# Patient Record
Sex: FEMALE | Race: WHITE | NOT HISPANIC OR LATINO | Employment: PART TIME | URBAN - METROPOLITAN AREA
[De-identification: names, ages, dates, MRNs, and addresses within clinical notes are randomized per-mention and may not be internally consistent; named-entity substitution may affect disease eponyms.]

---

## 2017-06-22 ENCOUNTER — ALLSCRIPTS OFFICE VISIT (OUTPATIENT)
Dept: OTHER | Facility: OTHER | Age: 42
End: 2017-06-22

## 2017-10-16 ENCOUNTER — GENERIC CONVERSION - ENCOUNTER (OUTPATIENT)
Dept: OTHER | Facility: OTHER | Age: 42
End: 2017-10-16

## 2018-01-12 VITALS
BODY MASS INDEX: 18.8 KG/M2 | TEMPERATURE: 97.9 F | SYSTOLIC BLOOD PRESSURE: 118 MMHG | RESPIRATION RATE: 14 BRPM | HEART RATE: 80 BPM | DIASTOLIC BLOOD PRESSURE: 70 MMHG | HEIGHT: 66 IN | WEIGHT: 117 LBS

## 2018-01-17 NOTE — RESULT NOTES
Message   I called pt CBC are normal  Pt states she feels a little better after antibiotics cream  Pt asked about CT scan and I told her it is NOT necessary        Verified Results  (1) CBC/PLT/DIFF [id number] 12:29PM Idalmis Backers   REPORT COMMENT:  FASTING:NO     Test Name Result Flag Reference   WHITE BLOOD CELL COUNT 7 5 Thousand/uL  3 8-10 8   RED BLOOD CELL COUNT 4 61 Million/uL  3 80-5 10   HEMOGLOBIN 13 3 g/dL  11 7-15 5   HEMATOCRIT 41 5 %  35 0-45 0   MCV 89 9 fL  80 0-100 0   MCH 28 9 pg  27 0-33 0   MCHC 32 1 g/dL  32 0-36 0   RDW 12 8 %  11 0-15 0   PLATELET COUNT 214 Thousand/uL  140-400   MPV 11 6 fL H 7 5-11 5   ABSOLUTE NEUTROPHILS 5010 cells/uL  0261-4331   ABSOLUTE LYMPHOCYTES 1853 cells/uL  850-3900   ABSOLUTE MONOCYTES 563 cells/uL  200-950   ABSOLUTE EOSINOPHILS 45 cells/uL     ABSOLUTE BASOPHILS 30 cells/uL  0-200   NEUTROPHILS 66 8 %     LYMPHOCYTES 24 7 %     MONOCYTES 7 5 %     EOSINOPHILS 0 6 %     BASOPHILS 0 4 %

## 2018-01-22 VITALS
TEMPERATURE: 98.4 F | HEIGHT: 66 IN | HEART RATE: 76 BPM | SYSTOLIC BLOOD PRESSURE: 130 MMHG | WEIGHT: 124 LBS | DIASTOLIC BLOOD PRESSURE: 86 MMHG | RESPIRATION RATE: 12 BRPM | BODY MASS INDEX: 19.93 KG/M2

## 2019-11-11 ENCOUNTER — OFFICE VISIT (OUTPATIENT)
Dept: FAMILY MEDICINE CLINIC | Facility: CLINIC | Age: 44
End: 2019-11-11
Payer: COMMERCIAL

## 2019-11-11 VITALS
BODY MASS INDEX: 22.33 KG/M2 | SYSTOLIC BLOOD PRESSURE: 116 MMHG | OXYGEN SATURATION: 99 % | DIASTOLIC BLOOD PRESSURE: 78 MMHG | HEART RATE: 72 BPM | TEMPERATURE: 99.1 F | RESPIRATION RATE: 12 BRPM | HEIGHT: 65 IN | WEIGHT: 134 LBS

## 2019-11-11 DIAGNOSIS — L03.211 CELLULITIS OF FACE: ICD-10-CM

## 2019-11-11 DIAGNOSIS — T78.40XA ALLERGIC STATE, INITIAL ENCOUNTER: ICD-10-CM

## 2019-11-11 DIAGNOSIS — L03.211 CELLULITIS OF FACE: Primary | ICD-10-CM

## 2019-11-11 PROBLEM — K64.4 EXTERNAL HEMORRHOIDS: Status: ACTIVE | Noted: 2017-06-22

## 2019-11-11 PROBLEM — K59.00 CONSTIPATION: Status: ACTIVE | Noted: 2017-06-22

## 2019-11-11 PROBLEM — Z01.411 ENCOUNTER FOR GYNECOLOGICAL EXAMINATION WITH ABNORMAL FINDING: Status: ACTIVE | Noted: 2019-01-29

## 2019-11-11 PROBLEM — N76.1 CHRONIC VAGINITIS: Status: ACTIVE | Noted: 2017-01-11

## 2019-11-11 PROCEDURE — 3725F SCREEN DEPRESSION PERFORMED: CPT | Performed by: FAMILY MEDICINE

## 2019-11-11 PROCEDURE — 99213 OFFICE O/P EST LOW 20 MIN: CPT | Performed by: FAMILY MEDICINE

## 2019-11-11 PROCEDURE — 1036F TOBACCO NON-USER: CPT | Performed by: FAMILY MEDICINE

## 2019-11-11 RX ORDER — CEFUROXIME AXETIL 500 MG/1
500 TABLET ORAL EVERY 12 HOURS SCHEDULED
Qty: 20 TABLET | Refills: 0 | Status: SHIPPED | OUTPATIENT
Start: 2019-11-11 | End: 2019-11-12 | Stop reason: SDUPTHER

## 2019-11-11 RX ORDER — FLUTICASONE PROPIONATE 50 MCG
2 SPRAY, SUSPENSION (ML) NASAL DAILY
COMMUNITY
Start: 2013-09-17

## 2019-11-11 RX ORDER — TRAMADOL HYDROCHLORIDE 50 MG/1
50 TABLET ORAL EVERY 12 HOURS PRN
Qty: 20 TABLET | Refills: 0 | Status: SHIPPED | OUTPATIENT
Start: 2019-11-11 | End: 2019-11-12 | Stop reason: SDUPTHER

## 2019-11-11 RX ORDER — DROSPIRENONE AND ETHINYL ESTRADIOL 0.03MG-3MG
1 KIT ORAL DAILY
COMMUNITY
Start: 2019-01-29

## 2019-11-11 RX ORDER — OMEGA-3 FATTY ACIDS/FISH OIL 300-1000MG
CAPSULE ORAL
COMMUNITY
Start: 2015-11-23 | End: 2020-09-09 | Stop reason: ALTCHOICE

## 2019-11-11 RX ORDER — DIPHENHYDRAMINE HCL 25 MG
25 CAPSULE ORAL EVERY 6 HOURS PRN
COMMUNITY

## 2019-11-11 RX ORDER — MONTELUKAST SODIUM 10 MG/1
10 TABLET ORAL
Qty: 30 TABLET | Refills: 1 | Status: SHIPPED | OUTPATIENT
Start: 2019-11-11 | End: 2019-11-12 | Stop reason: SDUPTHER

## 2019-11-11 NOTE — PROGRESS NOTES
Chief Complaint   Patient presents with    Sinus Problem     Symptoms sinus swelling and pain started Friday night  Assessment/Plan:    Advised pt to come here annually for physical exam  Pt states she will call when she gets better  Diagnoses and all orders for this visit:    Cellulitis of face  -     cefuroxime (CEFTIN) 500 mg tablet; Take 1 tablet (500 mg total) by mouth every 12 (twelve) hours for 10 days  -     traMADol (ULTRAM) 50 mg tablet; Take 1 tablet (50 mg total) by mouth every 12 (twelve) hours as needed for moderate pain    Allergic state, initial encounter  -     montelukast (SINGULAIR) 10 mg tablet; Take 1 tablet (10 mg total) by mouth daily at bedtime    Other orders  -     Ibuprofen (ADVIL) 200 MG CAPS; Take by mouth  -     diphenhydrAMINE (BENADRYL) 25 mg capsule; Take 25 mg by mouth every 6 (six) hours as needed  -     Multiple Vitamins-Minerals (MULTIVITAMIN ADULTS PO); Take 1 capsule by mouth daily  -     drospirenone-ethinyl estradiol (EMERY) 3-0 03 MG per tablet; Take 1 tablet by mouth daily  -     fluticasone (FLONASE) 50 mcg/act nasal spray; 2 sprays into each nostril daily          Subjective:      Patient ID: Maykel Mccrary is a 45 y o  female  HPI    Pt is here with her son and daughter  C/o nose pain and face swollen for 3 days  Pt states her nose pain started Friday night  Then she noticed face swollen and facial pain during weekends  She has severe nose/face pain, 8/10  She tried OTC ibuprofen 200mg 4 tabs but no help and it made her stomachache  She states she had similar episode 2 years ago and antibiotics helped  Pt states she picks her nose always  Denies fever, Sob, cP, n/v/abd pain  Pt also states she has seasonal allergies, had watery eyes, running nose, tried OTC claritin, zyrtec, xyzal, but nothing helped               The following portions of the patient's history were reviewed and updated as appropriate: allergies, current medications, past family history, past medical history, past social history, past surgical history and problem list     Review of Systems   Constitutional: Negative for appetite change, chills and fever  HENT: Positive for facial swelling and sinus pain  Negative for congestion, ear pain and sore throat  Eyes: Negative for discharge and itching  Respiratory: Negative for apnea, cough, chest tightness, shortness of breath and wheezing  Cardiovascular: Negative for chest pain, palpitations and leg swelling  Gastrointestinal: Negative for abdominal pain, anal bleeding, constipation, diarrhea, nausea and vomiting  Endocrine: Negative for cold intolerance, heat intolerance and polyuria  Genitourinary: Negative for difficulty urinating and dysuria  Musculoskeletal: Negative for arthralgias, back pain and myalgias  Skin: Negative for rash  Neurological: Negative for dizziness and headaches  Psychiatric/Behavioral: Negative for agitation  Objective:      /78 (BP Location: Left arm, Patient Position: Sitting, Cuff Size: Adult)   Pulse 72   Temp 99 1 °F (37 3 °C) (Tympanic)   Resp 12   Ht 5' 5" (1 651 m)   Wt 60 8 kg (134 lb)   SpO2 99%   BMI 22 30 kg/m²          Physical Exam   Constitutional: She appears well-developed  No distress  HENT:   Head: Normocephalic  Right Ear: External ear normal    Left Ear: External ear normal    Mouth/Throat: Oropharynx is clear and moist    B/l nasal swollen  Face mild swollen, tenderness, no erythema   Eyes: Pupils are equal, round, and reactive to light  Conjunctivae are normal  Right eye exhibits no discharge  Left eye exhibits no discharge  Neck: Normal range of motion  No thyromegaly present  Cardiovascular: Normal rate, regular rhythm and normal heart sounds  Exam reveals no gallop and no friction rub  No murmur heard  Pulmonary/Chest: Effort normal and breath sounds normal  No respiratory distress  She has no wheezes  She has no rales   She exhibits no tenderness  Abdominal: Soft  Bowel sounds are normal    Lymphadenopathy:     She has no cervical adenopathy  Neurological: She is alert  Psychiatric: She has a normal mood and affect

## 2019-11-11 NOTE — TELEPHONE ENCOUNTER
Patients prescriptions for cefuroxime, montelukast and traMADol need to be sent to the [facility] in [place] (# [phone] as the original pharmacy does not take her insurance

## 2019-11-13 RX ORDER — TRAMADOL HYDROCHLORIDE 50 MG/1
50 TABLET ORAL EVERY 12 HOURS PRN
Qty: 20 TABLET | Refills: 0 | Status: SHIPPED | OUTPATIENT
Start: 2019-11-13 | End: 2020-09-09 | Stop reason: ALTCHOICE

## 2019-11-13 RX ORDER — MONTELUKAST SODIUM 10 MG/1
10 TABLET ORAL
Qty: 30 TABLET | Refills: 1 | Status: SHIPPED | OUTPATIENT
Start: 2019-11-13 | End: 2020-09-09 | Stop reason: ALTCHOICE

## 2019-11-13 RX ORDER — CEFUROXIME AXETIL 500 MG/1
500 TABLET ORAL EVERY 12 HOURS SCHEDULED
Qty: 20 TABLET | Refills: 0 | Status: SHIPPED | OUTPATIENT
Start: 2019-11-13 | End: 2019-11-23

## 2020-09-09 ENCOUNTER — OFFICE VISIT (OUTPATIENT)
Dept: FAMILY MEDICINE CLINIC | Facility: CLINIC | Age: 45
End: 2020-09-09
Payer: COMMERCIAL

## 2020-09-09 VITALS
DIASTOLIC BLOOD PRESSURE: 78 MMHG | HEIGHT: 65 IN | HEART RATE: 96 BPM | BODY MASS INDEX: 21.02 KG/M2 | RESPIRATION RATE: 14 BRPM | SYSTOLIC BLOOD PRESSURE: 120 MMHG | TEMPERATURE: 98 F | OXYGEN SATURATION: 99 % | WEIGHT: 126.2 LBS

## 2020-09-09 DIAGNOSIS — M62.838 MUSCLE SPASM: Primary | ICD-10-CM

## 2020-09-09 DIAGNOSIS — M54.2 NECK PAIN: ICD-10-CM

## 2020-09-09 DIAGNOSIS — Z00.00 ENCOUNTER FOR PREVENTIVE CARE: ICD-10-CM

## 2020-09-09 PROCEDURE — 3725F SCREEN DEPRESSION PERFORMED: CPT | Performed by: FAMILY MEDICINE

## 2020-09-09 PROCEDURE — 99213 OFFICE O/P EST LOW 20 MIN: CPT | Performed by: FAMILY MEDICINE

## 2020-09-09 PROCEDURE — 1036F TOBACCO NON-USER: CPT | Performed by: FAMILY MEDICINE

## 2020-09-09 RX ORDER — CYCLOBENZAPRINE HCL 10 MG
10 TABLET ORAL 3 TIMES DAILY PRN
Qty: 30 TABLET | Refills: 0 | Status: SHIPPED | OUTPATIENT
Start: 2020-09-09

## 2020-09-09 RX ORDER — NAPROXEN 500 MG/1
500 TABLET ORAL 2 TIMES DAILY WITH MEALS
Qty: 60 TABLET | Refills: 0 | Status: SHIPPED | OUTPATIENT
Start: 2020-09-09 | End: 2020-10-05

## 2020-10-03 DIAGNOSIS — M62.838 MUSCLE SPASM: ICD-10-CM

## 2020-10-05 RX ORDER — NAPROXEN 500 MG/1
TABLET ORAL
Qty: 60 TABLET | Refills: 0 | Status: SHIPPED | OUTPATIENT
Start: 2020-10-05

## 2022-01-05 ENCOUNTER — TELEMEDICINE (OUTPATIENT)
Dept: FAMILY MEDICINE CLINIC | Facility: CLINIC | Age: 47
End: 2022-01-05
Payer: COMMERCIAL

## 2022-01-05 DIAGNOSIS — J02.9 PHARYNGITIS, UNSPECIFIED ETIOLOGY: Primary | ICD-10-CM

## 2022-01-05 DIAGNOSIS — Z20.822 EXPOSURE TO COVID-19 VIRUS: ICD-10-CM

## 2022-01-05 PROCEDURE — 99213 OFFICE O/P EST LOW 20 MIN: CPT | Performed by: FAMILY MEDICINE

## 2022-01-05 RX ORDER — CEFUROXIME AXETIL 500 MG/1
500 TABLET ORAL EVERY 12 HOURS SCHEDULED
Qty: 20 TABLET | Refills: 0 | Status: SHIPPED | OUTPATIENT
Start: 2022-01-05 | End: 2022-01-15

## 2022-01-05 RX ORDER — LIDOCAINE HYDROCHLORIDE 20 MG/ML
15 SOLUTION OROPHARYNGEAL 4 TIMES DAILY PRN
Qty: 100 ML | Refills: 0 | Status: SHIPPED | OUTPATIENT
Start: 2022-01-05

## 2022-01-05 NOTE — PROGRESS NOTES
Virtual Regular Visit    Verification of patient location:    Patient is located in the following state in which I hold an active license [place]      Assessment/Plan:    Problem List Items Addressed This Visit     None      Visit Diagnoses     Pharyngitis, unspecified etiology    -  Primary    Relevant Medications    cefuroxime (CEFTIN) 500 mg tablet    Lidocaine Viscous HCl (XYLOCAINE) 2 % mucosal solution    Exposure to COVID-19 virus        Relevant Orders    COVID Only- Collected at [facility] or [facility]               Reason for visit is   Chief Complaint   Patient presents with    Sore Throat     Throat swollen and unable to eat or drink, white spots on tounge, body aches, ears ache with pressure and burning,  and slight cough   Virtual Regular Visit      Health Maintenance   Topic Date Due    Hepatitis C Screening  Never done    COVID-19 Vaccine (1) Never done    HIV Screening  Never done    BMI: Adult  Never done    Annual Physical  Never done    Cervical Cancer Screening  Never done    Breast Cancer Screening: Mammogram  Never done    Influenza Vaccine (1) 09/01/2021    DTaP,Tdap,and Td Vaccines (3 - Td or Tdap) 02/13/2025    Pneumococcal Vaccine: Pediatrics (0 to 5 Years) and At-Risk Patients (6 to 59 Years)  Aged Out    HIB Vaccine  Aged Out    Hepatitis B Vaccine  Aged Out    IPV Vaccine  Aged Out    Hepatitis A Vaccine  Aged Out    Meningococcal ACWY Vaccine  Aged Out    HPV Vaccine  Aged Out     Encounter provider Cynthia Jones MD    Provider located at [address] [place] [address]  [address] [id number]      Recent Visits  No visits were found meeting these conditions    Showing recent visits within past 7 days and meeting all other requirements  Today's Visits  Date Type Provider Dept   01/05/22 240 [facility] [address], [address] today's visits and meeting all other requirements  Future Appointments  No visits were found meeting these conditions  Showing future appointments within next 150 days and meeting all other requirements       The patient was identified by name and date of birth  Bianca Stover was informed that this is a telemedicine visit and that the visit is being conducted through [address] [place] [address] [facility] and patient was informed that this is a secure, HIPAA-compliant platform  She agrees to proceed     My office door was closed  No one else was in the room  She acknowledged consent and understanding of privacy and security of the video platform  The patient has agreed to participate and understands they can discontinue the visit at any time  Patient is aware this is a billable service  Subjective  Bianca Stover is a 42 y o  female for sore throat   HPI     C/o sore throat for 3 days  Pt states she saw white spot/ulcer on her tonsils  Hard to eat or drink  Felt pain radiating to her ears  No fever but body ache  Worse in right lower back  Denies cough, SOB, CP, n/v/abd pain   Daughter had covid19 during Christmas  Pt is unvaccinated  History reviewed  No pertinent past medical history      Past Surgical History:   Procedure Laterality Date    CERVICAL BIOPSY  W/ LOOP ELECTRODE EXCISION         Current Outpatient Medications   Medication Sig Dispense Refill    cefuroxime (CEFTIN) 500 mg tablet Take 1 tablet (500 mg total) by mouth every 12 (twelve) hours for 10 days 20 tablet 0    cyclobenzaprine (FLEXERIL) 10 mg tablet Take 1 tablet (10 mg total) by mouth 3 (three) times a day as needed for muscle spasms 30 tablet 0    diphenhydrAMINE (BENADRYL) 25 mg capsule Take 25 mg by mouth every 6 (six) hours as needed      drospirenone-ethinyl estradiol (EMERY) 3-0 03 MG per tablet Take 1 tablet by mouth daily      fluticasone (FLONASE) 50 mcg/act nasal spray 2 sprays into each nostril daily      Lidocaine Viscous HCl (XYLOCAINE) 2 % mucosal solution Swish and spit 15 mL 4 (four) times a day as needed for mouth pain or discomfort 100 mL 0    Multiple Vitamins-Minerals (MULTIVITAMIN ADULTS PO) Take 1 capsule by mouth daily      naproxen (NAPROSYN) 500 mg tablet TAKE 1 TABLET BY MOUTH TWICE A DAY WITH MEALS 60 tablet 0     No current facility-administered medications for this visit  Allergies   Allergen Reactions    [place] (Diagnostic) - Food Allergy Other (See Comments)     almonds :  throat closes    Pollen Extract Other (See Comments)     eyes itchy,sneezing,    Tree Extract     Penicillins Rash       Review of Systems   Constitutional: Negative for appetite change, chills and fever  HENT: Positive for ear pain and sore throat  Negative for congestion and sinus pain  Eyes: Negative for discharge and itching  Respiratory: Negative for apnea, cough, chest tightness, shortness of breath and wheezing  Cardiovascular: Negative for chest pain, palpitations and leg swelling  Gastrointestinal: Negative for abdominal pain, anal bleeding, constipation, diarrhea, nausea and vomiting  Endocrine: Negative for cold intolerance, heat intolerance and polyuria  Genitourinary: Negative for difficulty urinating and dysuria  Musculoskeletal: Positive for back pain and myalgias  Negative for arthralgias  Skin: Negative for rash  Neurological: Positive for weakness  Negative for dizziness and headaches  Psychiatric/Behavioral: Negative for agitation  Video Exam    There were no vitals filed for this visit  Physical Exam  Constitutional:       General: She is not in acute distress  Pulmonary:      Effort: Pulmonary effort is normal    Musculoskeletal:      Cervical back: Normal range of motion  Neurological:      Mental Status: She is alert  I spent 10 minutes directly with the patient during this visit    VIRTUAL VISIT [address] verbally agrees to participate in [employer]   Pt is aware that [facility] could be limited without vital signs or the ability to perform a full hands-on physical exam  Melissa Mari understands she or the provider may request at any time to terminate the video visit and request the patient to seek care or treatment in person

## 2022-01-10 PROCEDURE — U0003 INFECTIOUS AGENT DETECTION BY NUCLEIC ACID (DNA OR RNA); SEVERE ACUTE RESPIRATORY SYNDROME CORONAVIRUS 2 (SARS-COV-2) (CORONAVIRUS DISEASE [COVID-19]), AMPLIFIED PROBE TECHNIQUE, MAKING USE OF HIGH THROUGHPUT TECHNOLOGIES AS DESCRIBED BY CMS-2020-01-R: HCPCS | Performed by: FAMILY MEDICINE

## 2022-01-10 PROCEDURE — U0005 INFEC AGEN DETEC AMPLI PROBE: HCPCS | Performed by: FAMILY MEDICINE

## 2022-01-12 ENCOUNTER — TELEMEDICINE (OUTPATIENT)
Dept: FAMILY MEDICINE CLINIC | Facility: CLINIC | Age: 47
End: 2022-01-12
Payer: COMMERCIAL

## 2022-01-12 DIAGNOSIS — U07.1 COVID-19: Primary | ICD-10-CM

## 2022-01-12 PROCEDURE — 99213 OFFICE O/P EST LOW 20 MIN: CPT | Performed by: FAMILY MEDICINE

## 2022-01-12 NOTE — PROGRESS NOTES
COVID-19 Outpatient Progress Note    Chief Complaint   Patient presents with    COVID-19     Positive 01/10/22  Health Maintenance   Topic Date Due    Hepatitis C Screening  Never done    COVID-19 Vaccine (1) Never done    HIV Screening  Never done    BMI: Adult  Never done    Annual Physical  Never done    Cervical Cancer Screening  Never done    Breast Cancer Screening: Mammogram  Never done    Influenza Vaccine (1) 09/01/2021    DTaP,Tdap,and Td Vaccines (3 - Td or Tdap) 02/13/2025    Pneumococcal Vaccine: Pediatrics (0 to 5 Years) and At-Risk Patients (6 to 59 Years)  Aged Out    HIB Vaccine  Aged Out    Hepatitis B Vaccine  Aged Out    IPV Vaccine  Aged Out    Hepatitis A Vaccine  Aged Out    Meningococcal ACWY Vaccine  Aged Out    HPV Vaccine  Aged Out          Assessment/Plan:    Problem List Items Addressed This Visit        Other    COVID-19 - Primary     Day 10 today  75% improved  Still has lower back pain  May use tylenol for pain  Disposition:     Patient has COVID-19 infection  Based off CDC guidelines, they were recommended to isolate for 5 days from the date of the positive test  If they remain asymptomatic, isolation may be ended followed by 5 days of wearing a mask when around othes to minimize risk of infecting others  If they have a fever, continue to stay home until fever resolves for at least 24 hours  I have spent 15 minutes directly with the patient  Greater than 50% of this time was spent in counseling/coordination of care regarding: diagnostic results, prognosis, risks and benefits of treatment options, instructions for management, patient and family education, risk factor reductions and impressions        Encounter provider Yamil Drake MD    Provider located at [address] [place] [phone]    Recent Visits  Date Type Provider Dept   01/05/22 240 [facility] [address], [facility]  [facility]   Showing recent visits within past 7 days and meeting all other requirements  Today's Visits  Date Type Provider Dept   01/12/22 240 [facility] [address], [place] [address] today's visits and meeting all other requirements  Future Appointments  No visits were found meeting these conditions  Showing future appointments within next 150 days and meeting all other requirements     This virtual check-in was done via [address] and patient was informed that this is a secure, HIPAA-compliant platform  She agrees to proceed  Patient agrees to participate in a virtual check in via telephone or video visit instead of presenting to the office to address urgent/immediate medical needs  Patient is aware this is a billable service  After connecting through [facility], the patient was identified by name and date of birth  Regina Gunn was informed that this was a telemedicine visit and that the exam was being conducted confidentially over secure lines  Regina Gunn acknowledged consent and understanding of privacy and security of the telemedicine visit  I informed the patient that I have reviewed her record in Epic and presented the opportunity for her to ask any questions regarding the visit today  The patient agreed to participate  Verification of patient location:  Patient is located in the following state in which I hold an active license: [place]    Subjective:   Regina Gunn is a 42 y o  female who has been screened for COVID-19  Symptom change since last report: improving  Patient's symptoms include fatigue, sore throat, anosmia, loss of taste, nausea, vomiting, myalgias and headache  Patient denies fever, chills, congestion, rhinorrhea, cough, shortness of breath, chest tightness, abdominal pain and diarrhea  - Date of symptom onset: 1/3/2022  - Date of positive COVID-19 PCR: 1/10/2022   Type of test: PCR    COVID-19 vaccination status: Not vaccinated    Nate has been staying home and has isolated themselves in her home  She is taking care to not share personal items and is cleaning all surfaces that are touched often, like counters, tabletops, and doorknobs using household cleaning sprays or wipes  She is wearing a mask when she leaves her room  70% better  Still has lower back pain  Denies urinary symptoms now  Lab Results   Component Value Date    SARSCOV2 Positive (A) 01/10/2022     History reviewed  No pertinent past medical history  Past Surgical History:   Procedure Laterality Date    CERVICAL BIOPSY  W/ LOOP ELECTRODE EXCISION       Current Outpatient Medications   Medication Sig Dispense Refill    cefuroxime (CEFTIN) 500 mg tablet Take 1 tablet (500 mg total) by mouth every 12 (twelve) hours for 10 days 20 tablet 0    cyclobenzaprine (FLEXERIL) 10 mg tablet Take 1 tablet (10 mg total) by mouth 3 (three) times a day as needed for muscle spasms 30 tablet 0    diphenhydrAMINE (BENADRYL) 25 mg capsule Take 25 mg by mouth every 6 (six) hours as needed      drospirenone-ethinyl estradiol (EMERY) 3-0 03 MG per tablet Take 1 tablet by mouth daily      fluticasone (FLONASE) 50 mcg/act nasal spray 2 sprays into each nostril daily      Lidocaine Viscous HCl (XYLOCAINE) 2 % mucosal solution Swish and spit 15 mL 4 (four) times a day as needed for mouth pain or discomfort 100 mL 0    Multiple Vitamins-Minerals (MULTIVITAMIN ADULTS PO) Take 1 capsule by mouth daily      naproxen (NAPROSYN) 500 mg tablet TAKE 1 TABLET BY MOUTH TWICE A DAY WITH MEALS 60 tablet 0     No current facility-administered medications for this visit  Allergies   Allergen Reactions    [place] (Diagnostic) - Food Allergy Other (See Comments)     almonds :  throat closes    Pollen Extract Other (See Comments)     eyes itchy,sneezing,    Tree Extract     Penicillins Rash       Review of Systems   Constitutional: Positive for fatigue  Negative for appetite change, chills and fever     HENT: Positive for sore throat  Negative for congestion, ear pain, rhinorrhea and sinus pain  Eyes: Negative for discharge and itching  Respiratory: Negative for apnea, cough, chest tightness, shortness of breath and wheezing  Cardiovascular: Negative for chest pain, palpitations and leg swelling  Gastrointestinal: Positive for nausea and vomiting  Negative for abdominal pain, anal bleeding, constipation and diarrhea  Endocrine: Negative for cold intolerance, heat intolerance and polyuria  Genitourinary: Negative for difficulty urinating and dysuria  Musculoskeletal: Positive for back pain and myalgias  Negative for arthralgias  Skin: Negative for rash  Neurological: Positive for headaches  Negative for dizziness  Psychiatric/Behavioral: Negative for agitation  Objective: There were no vitals filed for this visit  Physical Exam  Constitutional:       Appearance: Normal appearance  Eyes:      General:         Right eye: No discharge  Left eye: No discharge  Conjunctiva/sclera: Conjunctivae normal    Pulmonary:      Effort: Pulmonary effort is normal    Musculoskeletal:         General: Normal range of motion  Cervical back: Normal range of motion  Neurological:      Mental Status: She is alert  VIRTUAL VISIT DISCLAIMER    Regina Gunn verbally agrees to participate in [employer]  Pt is aware that [employer] could be limited without vital signs or the ability to perform a full hands-on physical exam  Jennifer Lynder Primrose understands she or the provider may request at any time to terminate the video visit and request the patient to seek care or treatment in person

## 2022-11-10 ENCOUNTER — OFFICE VISIT (OUTPATIENT)
Dept: URGENT CARE | Age: 47
End: 2022-11-10

## 2022-11-10 ENCOUNTER — APPOINTMENT (OUTPATIENT)
Dept: LAB | Age: 47
End: 2022-11-10

## 2022-11-10 ENCOUNTER — APPOINTMENT (OUTPATIENT)
Dept: RADIOLOGY | Age: 47
End: 2022-11-10

## 2022-11-10 VITALS
HEART RATE: 84 BPM | DIASTOLIC BLOOD PRESSURE: 86 MMHG | OXYGEN SATURATION: 99 % | TEMPERATURE: 98 F | RESPIRATION RATE: 18 BRPM | SYSTOLIC BLOOD PRESSURE: 122 MMHG

## 2022-11-10 DIAGNOSIS — M79.671 RIGHT FOOT PAIN: Primary | ICD-10-CM

## 2022-11-10 DIAGNOSIS — M79.671 RIGHT FOOT PAIN: ICD-10-CM

## 2022-11-10 LAB — URATE SERPL-MCNC: 3.4 MG/DL (ref 2–7.5)

## 2022-11-10 RX ORDER — INDOMETHACIN 50 MG/1
50 CAPSULE ORAL
Qty: 15 CAPSULE | Refills: 0 | Status: SHIPPED | OUTPATIENT
Start: 2022-11-10 | End: 2022-11-15

## 2022-11-10 NOTE — PROGRESS NOTES
[address] [facility]        NAME: Donna Jo is a 41 y o  female  : 1975    MRN: [id number]  DATE: November 10, 2022  TIME: 11:27 AM    Assessment and Plan   Right foot pain [M79 671]  1  Right foot pain  XR foot 3+ vw right    indomethacin (INDOCIN) 50 mg capsule    Uric acid         Patient Instructions     XR prelim reading: no acute fractures or osseous abnormalities  No soft tissue swelling  Presentation consistent with gout  Start Indomethacin  F/u on uric acid level  If negative, follow up with PCP or Ortho for further workup  Follow up with PCP in 2-3 days  Proceed to ER if symptoms worsen  Chief Complaint     Chief Complaint   Patient presents with   • Foot Pain     Right foot pain starting yesterday evening  On ball of foot  OTC pain medication not working  Denies trauma         History of Present Illness     41 y o  F presents with complaint of R foot pain starting last night  Denies trauma or injury  Denies strenuous activity  States she walks a lot to get to school  Took max dose of Ibuprofen with no relief in pain  No ice or heat  Denies prior trauma or surgery  Able to bear weight  Denies numbness or tingling  Denies hx of arthritis or gout  Recent dx of nephrolithiasis 2022  Review of Systems   Review of Systems   Constitutional: Negative for chills, fatigue and fever  HENT: Negative for congestion, ear pain, facial swelling, hearing loss, rhinorrhea, sinus pressure, sneezing, sore throat and trouble swallowing  Eyes: Negative for pain, redness and visual disturbance  Respiratory: Negative for cough, chest tightness, shortness of breath and wheezing  Cardiovascular: Negative for chest pain and palpitations  Gastrointestinal: Negative for abdominal pain, diarrhea, nausea and vomiting  Genitourinary: Negative for dysuria, flank pain, hematuria and pelvic pain  Musculoskeletal: Positive for arthralgias and joint swelling  Negative for back pain and myalgias  Skin: Negative for color change and rash  Neurological: Negative for dizziness, seizures, syncope, weakness, light-headedness, numbness and headaches  Psychiatric/Behavioral: Negative for confusion, hallucinations and sleep disturbance  The patient is not nervous/anxious  All other systems reviewed and are negative          Current Medications       Current Outpatient Medications:   •  indomethacin (INDOCIN) 50 mg capsule, Take 1 capsule (50 mg total) by mouth 3 (three) times a day with meals for 5 days, Disp: 15 capsule, Rfl: 0  •  Multiple Vitamins-Minerals (MULTIVITAMIN ADULTS PO), Take 1 capsule by mouth daily, Disp: , Rfl:   •  cyclobenzaprine (FLEXERIL) 10 mg tablet, Take 1 tablet (10 mg total) by mouth 3 (three) times a day as needed for muscle spasms (Patient not taking: Reported on 11/10/2022), Disp: 30 tablet, Rfl: 0  •  diphenhydrAMINE (BENADRYL) 25 mg capsule, Take 25 mg by mouth every 6 (six) hours as needed (Patient not taking: Reported on 11/10/2022), Disp: , Rfl:   •  drospirenone-ethinyl estradiol (EMERY) 3-0 03 MG per tablet, Take 1 tablet by mouth daily (Patient not taking: Reported on 11/10/2022), Disp: , Rfl:   •  fluticasone (FLONASE) 50 mcg/act nasal spray, 2 sprays into each nostril daily (Patient not taking: Reported on 11/10/2022), Disp: , Rfl:   •  Lidocaine Viscous HCl (XYLOCAINE) 2 % mucosal solution, Swish and spit 15 mL 4 (four) times a day as needed for mouth pain or discomfort (Patient not taking: Reported on 11/10/2022), Disp: 100 mL, Rfl: 0    Current Allergies     Allergies as of 11/10/2022 - Reviewed 11/10/2022   Allergen Reaction Noted   • [place] (diagnostic) - food allergy Other (See Comments) 07/02/2015   • Pollen extract Other (See Comments) 07/02/2015   • Tree extract  11/11/2019   • Penicillins Rash 06/06/2012            The following portions of the patient's history were reviewed and updated as appropriate: allergies, current medications, past family history, past medical history, past social history, past surgical history and problem list      History reviewed  No pertinent past medical history  Past Surgical History:   Procedure Laterality Date   • CERVICAL BIOPSY  W/ LOOP ELECTRODE EXCISION         Family History   Problem Relation Age of Onset   • Leukemia Brother    • Diabetes Paternal Grandmother    • Dementia Paternal Grandmother          Medications have been verified  Objective   /86   Pulse 84   Temp 98 °F (36 7 °C)   Resp 18   SpO2 99%   No LMP recorded  Physical Exam     Physical Exam  Vitals reviewed  Constitutional:       General: She is not in acute distress  Appearance: Normal appearance  She is not toxic-appearing  HENT:      Head: Normocephalic  Right Ear: Tympanic membrane normal  Tympanic membrane is not erythematous or bulging  Left Ear: Tympanic membrane normal  Tympanic membrane is not erythematous or bulging  Nose: No congestion or rhinorrhea  Right Sinus: No maxillary sinus tenderness or frontal sinus tenderness  Left Sinus: No maxillary sinus tenderness or frontal sinus tenderness  Mouth/Throat:      Pharynx: Uvula midline  No oropharyngeal exudate, posterior oropharyngeal erythema or uvula swelling  Tonsils: No tonsillar exudate or tonsillar abscesses  Eyes:      Extraocular Movements: Extraocular movements intact  Conjunctiva/sclera: Conjunctivae normal       Pupils: Pupils are equal, round, and reactive to light  Cardiovascular:      Rate and Rhythm: Normal rate and regular rhythm  Pulses: Normal pulses  Heart sounds: Normal heart sounds  Pulmonary:      Effort: Pulmonary effort is normal  No tachypnea or respiratory distress  Breath sounds: Normal breath sounds and air entry  No decreased breath sounds, wheezing, rhonchi or rales  Abdominal:      General: Bowel sounds are normal       Palpations: Abdomen is soft  Tenderness:  There is no abdominal tenderness  There is no right CVA tenderness or guarding  Musculoskeletal:         General: Normal range of motion  Cervical back: Normal range of motion  Right foot: Swelling and bony tenderness present  Comments:   TTP 1st MTP extending into MT  +swelling  Mild erythema, no warmth   Lymphadenopathy:      Cervical: No cervical adenopathy  Skin:     General: Skin is warm and dry  Neurological:      General: No focal deficit present  Mental Status: She is alert  Cranial Nerves: Cranial nerves are intact  Sensory: Sensation is intact  Motor: Motor function is intact  Coordination: Coordination is intact  Gait: Gait is intact  Deep Tendon Reflexes: Reflexes are normal and symmetric

## 2022-11-10 NOTE — PATIENT INSTRUCTIONS
XR was negative for any fractures or osseous abnormalities  Presentation consistent with gout  Will order uric acid level  Start Indomethacin as directed  Follow up with PCP in 3-5 days  Proceed to ER if symptoms worsen

## 2022-11-29 ENCOUNTER — OFFICE VISIT (OUTPATIENT)
Dept: FAMILY MEDICINE CLINIC | Facility: CLINIC | Age: 47
End: 2022-11-29

## 2022-11-29 VITALS
WEIGHT: 122 LBS | TEMPERATURE: 98.7 F | DIASTOLIC BLOOD PRESSURE: 68 MMHG | BODY MASS INDEX: 19.15 KG/M2 | SYSTOLIC BLOOD PRESSURE: 100 MMHG | RESPIRATION RATE: 16 BRPM | HEART RATE: 76 BPM | HEIGHT: 67 IN

## 2022-11-29 DIAGNOSIS — M54.50 CHRONIC RIGHT-SIDED LOW BACK PAIN WITHOUT SCIATICA: Primary | ICD-10-CM

## 2022-11-29 DIAGNOSIS — Z87.442 HISTORY OF KIDNEY STONES: ICD-10-CM

## 2022-11-29 DIAGNOSIS — Z13.220 SCREENING FOR HYPERLIPIDEMIA: ICD-10-CM

## 2022-11-29 DIAGNOSIS — F41.8 DEPRESSION WITH ANXIETY: ICD-10-CM

## 2022-11-29 DIAGNOSIS — M79.671 RIGHT FOOT PAIN: ICD-10-CM

## 2022-11-29 DIAGNOSIS — G89.29 CHRONIC RIGHT-SIDED LOW BACK PAIN WITHOUT SCIATICA: Primary | ICD-10-CM

## 2022-11-29 RX ORDER — CYCLOBENZAPRINE HCL 10 MG
10 TABLET ORAL 3 TIMES DAILY PRN
Qty: 30 TABLET | Refills: 0 | Status: SHIPPED | OUTPATIENT
Start: 2022-11-29

## 2022-11-29 NOTE — PROGRESS NOTES
Chief Complaint   Patient presents with   • Back Pain     Lower right side of back pain      Health Maintenance   Topic Date Due   • Hepatitis C Screening  Never done   • Hepatitis B Vaccine (1 of 3 - 3-dose series) Never done   • COVID-19 Vaccine (1) Never done   • HIV Screening  Never done   • Annual Physical  Never done   • Cervical Cancer Screening  Never done   • Breast Cancer Screening: Mammogram  Never done   • Influenza Vaccine (1) 2022   • Colorectal Cancer Screening  Never done   • BMI: Adult  2023   • DTaP,Tdap,and Td Vaccines (3 - Td or Tdap) 2025   • Pneumococcal Vaccine: Pediatrics (0 to 5 Years) and At-Risk Patients (6 to 59 Years)  Aged Out   • HIB Vaccine  Aged Out   • IPV Vaccine  Aged Out   • Hepatitis A Vaccine  Aged Out   • Meningococcal ACWY Vaccine  Aged Out   • HPV Vaccine  Aged Out     Name: Beba Saenz      : 1975      MRN: [id number]  Encounter Provider: Miles Najera MD  Encounter Date: 2022   Encounter department: [address] [place] [address]  Chronic right-sided low back pain without sciatica  Assessment & Plan:  DD includes muscle spasm, SI joint pain, arthritis etc  Advised pt to take tylenol 1000mg and ibuprofen 400mg 3 times per day  Give muscle relaxant  SE educated pt  Refer to PT and pain specialist       Orders:  -     Ambulatory Referral to Physical Therapy; Future  -     Ambulatory Referral to Pain Management; Future  -     cyclobenzaprine (FLEXERIL) 10 mg tablet; Take 1 tablet (10 mg total) by mouth 3 (three) times a day as needed for muscle spasms  -     CBC and differential; Future; Expected date: 2022  -     Comprehensive metabolic panel; Future; Expected date: 2022  -     Lipid Panel with Direct LDL reflex; Future; Expected date: 2022  -     TSH, 3rd generation with Free T4 reflex; Future; Expected date: 2022    2  Right foot pain  -     Uric acid; Future    3   History of kidney stones    4  Depression with anxiety  -     CBC and differential; Future; Expected date: 11/29/2022  -     Comprehensive metabolic panel; Future; Expected date: 11/29/2022  -     Lipid Panel with Direct LDL reflex; Future; Expected date: 11/29/2022  -     TSH, 3rd generation with Free T4 reflex; Future; Expected date: 11/29/2022    5  Screening for hyperlipidemia  -     CBC and differential; Future; Expected date: 11/29/2022  -     Comprehensive metabolic panel; Future; Expected date: 11/29/2022  -     Lipid Panel with Direct LDL reflex; Future; Expected date: 11/29/2022  -     TSH, 3rd generation with Free T4 reflex; Future; Expected date: 11/29/2022         Subjective      HPI     Pt is here with her son  Lower back pain for 1 year  Constant, 6-7/10, dull/burning pain  Radiating to right lower abdomen  No injury or falls  She tried ibuprofen but no help  Denies fever, urine/BM incontinence  Denies SOB, CP, n/v/diarrhea  2 weeks ago she had severe right foot pain  Went to urgent care  Uric acid 3 4 normal  Foot xray normal      She had kidney stone in summer  7/2022 CT showed "Left-sided obstructive uropathy secondary to a 4 mm distal ureteral   calculus  " Pt states she passed kidney stone later  Review of Systems   Constitutional: Negative for appetite change, chills and fever  HENT: Negative for congestion, ear pain, sinus pain and sore throat  Eyes: Negative for discharge and itching  Respiratory: Negative for apnea, cough, chest tightness, shortness of breath and wheezing  Cardiovascular: Negative for chest pain, palpitations and leg swelling  Gastrointestinal: Negative for abdominal pain, anal bleeding, constipation, diarrhea, nausea and vomiting  Endocrine: Negative for cold intolerance, heat intolerance and polyuria  Genitourinary: Negative for difficulty urinating and dysuria  Musculoskeletal: Positive for arthralgias and back pain  Negative for myalgias     Skin: Negative for rash  Neurological: Negative for dizziness and headaches  Psychiatric/Behavioral: Negative for agitation  Current Outpatient Medications on File Prior to Visit   Medication Sig   • drospirenone-ethinyl estradiol (EMERY) 3-0 03 MG per tablet Take 1 tablet by mouth daily   • [DISCONTINUED] cyclobenzaprine (FLEXERIL) 10 mg tablet Take 1 tablet (10 mg total) by mouth 3 (three) times a day as needed for muscle spasms (Patient not taking: Reported on 11/10/2022)   • [DISCONTINUED] diphenhydrAMINE (BENADRYL) 25 mg capsule Take 25 mg by mouth every 6 (six) hours as needed (Patient not taking: Reported on 11/10/2022)   • [DISCONTINUED] fluticasone (FLONASE) 50 mcg/act nasal spray 2 sprays into each nostril daily (Patient not taking: Reported on 11/10/2022)   • [DISCONTINUED] indomethacin (INDOCIN) 50 mg capsule Take 1 capsule (50 mg total) by mouth 3 (three) times a day with meals for 5 days   • [DISCONTINUED] Lidocaine Viscous HCl (XYLOCAINE) 2 % mucosal solution Swish and spit 15 mL 4 (four) times a day as needed for mouth pain or discomfort (Patient not taking: Reported on 11/10/2022)   • [DISCONTINUED] Multiple Vitamins-Minerals (MULTIVITAMIN ADULTS PO) Take 1 capsule by mouth daily       Objective     /68   Pulse 76   Temp 98 7 °F (37 1 °C) (Tympanic)   Resp 16   Ht 5' 6 5" (1 689 m)   Wt 55 3 kg (122 lb)   BMI 19 40 kg/m²     Physical Exam  Constitutional:       General: She is not in acute distress  Appearance: She is well-developed  HENT:      Head: Normocephalic  Mouth/Throat:      Mouth: Oropharynx is clear and moist    Eyes:      General:         Right eye: No discharge  Left eye: No discharge  Conjunctiva/sclera: Conjunctivae normal    Neck:      Thyroid: No thyromegaly  Cardiovascular:      Rate and Rhythm: Normal rate and regular rhythm  Heart sounds: Normal heart sounds  No murmur heard  No friction rub  No gallop     Pulmonary:      Effort: Pulmonary effort is normal  No respiratory distress  Breath sounds: Normal breath sounds  No wheezing or rales  Chest:      Chest wall: No tenderness  Abdominal:      General: Bowel sounds are normal  There is no distension  Palpations: Abdomen is soft  There is no mass  Tenderness: There is no abdominal tenderness  There is no guarding or rebound  Musculoskeletal:         General: Tenderness present  No deformity or edema  Normal range of motion  Cervical back: Normal range of motion  Comments: Right lower back tenderness   Lymphadenopathy:      Cervical: No cervical adenopathy  Neurological:      Mental Status: She is alert     Psychiatric:         Mood and Affect: Mood and affect normal        Savita Devlin MD

## 2022-11-29 NOTE — ASSESSMENT & PLAN NOTE
DD includes muscle spasm, SI joint pain, arthritis etc  Advised pt to take tylenol 1000mg and ibuprofen 400mg 3 times per day  Give muscle relaxant  SE educated pt   Refer to PT and pain specialist

## 2022-12-02 ENCOUNTER — APPOINTMENT (OUTPATIENT)
Dept: LAB | Facility: IMAGING CENTER | Age: 47
End: 2022-12-02

## 2022-12-02 DIAGNOSIS — Z13.220 SCREENING FOR HYPERLIPIDEMIA: ICD-10-CM

## 2022-12-02 DIAGNOSIS — F41.8 DEPRESSION WITH ANXIETY: ICD-10-CM

## 2022-12-02 DIAGNOSIS — G89.29 CHRONIC RIGHT-SIDED LOW BACK PAIN WITHOUT SCIATICA: ICD-10-CM

## 2022-12-02 DIAGNOSIS — M54.50 CHRONIC RIGHT-SIDED LOW BACK PAIN WITHOUT SCIATICA: ICD-10-CM

## 2022-12-02 DIAGNOSIS — M79.671 RIGHT FOOT PAIN: ICD-10-CM

## 2022-12-02 LAB
ALBUMIN SERPL BCP-MCNC: 3.4 G/DL (ref 3.5–5)
ALP SERPL-CCNC: 48 U/L (ref 46–116)
ALT SERPL W P-5'-P-CCNC: 15 U/L (ref 12–78)
ANION GAP SERPL CALCULATED.3IONS-SCNC: 6 MMOL/L (ref 4–13)
AST SERPL W P-5'-P-CCNC: 15 U/L (ref 5–45)
BASOPHILS # BLD AUTO: 0.03 THOUSANDS/ÂΜL (ref 0–0.1)
BASOPHILS NFR BLD AUTO: 1 % (ref 0–1)
BILIRUB SERPL-MCNC: 0.35 MG/DL (ref 0.2–1)
BUN SERPL-MCNC: 9 MG/DL (ref 5–25)
CALCIUM ALBUM COR SERPL-MCNC: 9.8 MG/DL (ref 8.3–10.1)
CALCIUM SERPL-MCNC: 9.3 MG/DL (ref 8.3–10.1)
CHLORIDE SERPL-SCNC: 106 MMOL/L (ref 96–108)
CHOLEST SERPL-MCNC: 186 MG/DL
CO2 SERPL-SCNC: 25 MMOL/L (ref 21–32)
CREAT SERPL-MCNC: 0.63 MG/DL (ref 0.6–1.3)
EOSINOPHIL # BLD AUTO: 0.06 THOUSAND/ÂΜL (ref 0–0.61)
EOSINOPHIL NFR BLD AUTO: 1 % (ref 0–6)
ERYTHROCYTE [DISTWIDTH] IN BLOOD BY AUTOMATED COUNT: 13.2 % (ref 11.6–15.1)
GFR SERPL CREATININE-BSD FRML MDRD: 108 ML/MIN/1.73SQ M
GLUCOSE P FAST SERPL-MCNC: 87 MG/DL (ref 65–99)
HCT VFR BLD AUTO: 41 % (ref 34.8–46.1)
HDLC SERPL-MCNC: 67 MG/DL
HGB BLD-MCNC: 12.8 G/DL (ref 11.5–15.4)
IMM GRANULOCYTES # BLD AUTO: 0.02 THOUSAND/UL (ref 0–0.2)
IMM GRANULOCYTES NFR BLD AUTO: 0 % (ref 0–2)
LDLC SERPL CALC-MCNC: 78 MG/DL (ref 0–100)
LYMPHOCYTES # BLD AUTO: 1.65 THOUSANDS/ÂΜL (ref 0.6–4.47)
LYMPHOCYTES NFR BLD AUTO: 27 % (ref 14–44)
MCH RBC QN AUTO: 28.9 PG (ref 26.8–34.3)
MCHC RBC AUTO-ENTMCNC: 31.2 G/DL (ref 31.4–37.4)
MCV RBC AUTO: 93 FL (ref 82–98)
MONOCYTES # BLD AUTO: 0.74 THOUSAND/ÂΜL (ref 0.17–1.22)
MONOCYTES NFR BLD AUTO: 12 % (ref 4–12)
NEUTROPHILS # BLD AUTO: 3.52 THOUSANDS/ÂΜL (ref 1.85–7.62)
NEUTS SEG NFR BLD AUTO: 59 % (ref 43–75)
NRBC BLD AUTO-RTO: 0 /100 WBCS
PLATELET # BLD AUTO: 223 THOUSANDS/UL (ref 149–390)
PMV BLD AUTO: 13.6 FL (ref 8.9–12.7)
POTASSIUM SERPL-SCNC: 4.3 MMOL/L (ref 3.5–5.3)
PROT SERPL-MCNC: 7.6 G/DL (ref 6.4–8.4)
RBC # BLD AUTO: 4.43 MILLION/UL (ref 3.81–5.12)
SODIUM SERPL-SCNC: 137 MMOL/L (ref 135–147)
TRIGL SERPL-MCNC: 203 MG/DL
TSH SERPL DL<=0.05 MIU/L-ACNC: 3.02 UIU/ML (ref 0.45–4.5)
URATE SERPL-MCNC: 4.1 MG/DL (ref 2–7.5)
WBC # BLD AUTO: 6.02 THOUSAND/UL (ref 4.31–10.16)

## 2022-12-08 ENCOUNTER — EVALUATION (OUTPATIENT)
Dept: PHYSICAL THERAPY | Facility: REHABILITATION | Age: 47
End: 2022-12-08

## 2022-12-08 DIAGNOSIS — M54.50 CHRONIC RIGHT-SIDED LOW BACK PAIN WITHOUT SCIATICA: Primary | ICD-10-CM

## 2022-12-08 DIAGNOSIS — G89.29 CHRONIC RIGHT-SIDED LOW BACK PAIN WITHOUT SCIATICA: Primary | ICD-10-CM

## 2022-12-08 NOTE — PROGRESS NOTES
PT Evaluation     Today's date: 2022  Patient name: Bianca Stover  : 1975  MRN: [id number]  Referring provider: Sancho Ortiz MD  Dx:   Encounter Diagnosis     ICD-10-CM    1  Chronic right-sided low back pain without sciatica  M54 50 Ambulatory Referral to Physical Therapy    G89 29                      Assessment  Assessment details: Pt is a pleasant 41 y o  female presenting to outpatient physical therapy with Chronic right-sided low back pain without sciatica  (primary encounter diagnosis)  Pt presents with pain, decreased range of motion, decreased strength, and decreased tolerance to activity  Pt presents with signs and symptoms consistent with R SIJ dysfunction with R PI rotation  Pt is a good candidate for outpatient physical therapy and would benefit from skilled physical therapy to address limitations and to achieve goals  Thank you for this referral    Impairments: abnormal coordination, abnormal or restricted ROM, activity intolerance, impaired physical strength, lacks appropriate home exercise program and pain with function  Understanding of Dx/Px/POC: good   Prognosis: good    Goals  ST  Patient will report 25% decrease in pain in 4 weeks  2  Patient will demonstrate 25% improvement in ROM in 4 weeks  3  Patient will demonstrate 1/2 grade improvement in strength in 4 weeks  LT  Patient will be able to perform IADLS without restriction or pain by discharge  2  Patient will be independent in HEP by discharge  3  Patient will be able to return to recreational/work duties without restriction or pain by discharge        Plan  Patient would benefit from: PT eval and skilled PT  Planned modality interventions: cryotherapy and thermotherapy: hydrocollator packs  Planned therapy interventions: IADL retraining, body mechanics training, flexibility, functional ROM exercises, home exercise program, neuromuscular re-education, manual therapy, postural training, strengthening, stretching, therapeutic activities, therapeutic exercise and joint mobilization  Frequency: 2x week  Duration in visits: 8  Duration in weeks: 4  Treatment plan discussed with: patient        Subjective Evaluation    History of Present Illness  Mechanism of injury: Pt is a 41year old female presenting to PT with chronic history of right low back pain  Pt denies any specific injury, trauma or preceding event  Pt reports she had COVID-19 in January 2022 and developed severe low back pain  She reports she then developed kidney issues and left flank pain, went to [facility] ER and had abdominal CT performed  Results not available in Epic  Pt went to PCP for further evaluation, no diagnostic imaging performed  Pt prescribed Flexeril for pain, which provided some pain relief  Pt referred to OPPT  Pt works PT, payroll/catering/accounting, for [employer]  Also in school  Pain Location: right lumbar spine    Pain Type: dull, aching, throbbing    Pain Intensity:  Current: 6  Best: 3  Worst: 7      Pt reports increased pain and/or difficulty with: walking, bending, twisting, lifting  Pt denies sleep disturbance secondary to pain  Pt reports decreased pain with: mediation, heating pad, left side-lying pillow between knees            Recurrent probem    Quality of life: good      Diagnostic Tests  CT scan: normal  Treatments  Current treatment: medication  Patient Goals  Patient goals for therapy: decreased pain, increased motion, increased strength and independence with ADLs/IADLs          Objective     Concurrent Complaints  Negative for night pain, disturbed sleep, bladder dysfunction, bowel dysfunction and saddle (S4) numbness    Postural Observations  Seated posture: good  Standing posture: good    Additional Postural Observation Details  WNL    Tenderness     Right Hip   Tenderness in the PSIS       Neurological Testing     Sensation     Lumbar   Left   Intact: light touch    Right   Intact: light touch    Reflexes Left   Patellar (L4): normal (2+)  Achilles (S1): normal (2+)    Right   Patellar (L4): normal (2+)  Achilles (S1): normal (2+)    Active Range of Motion     Lumbar   Flexion:  WFL  Extension: 15 degrees  with pain  Left lateral flexion:  WFL  Right lateral flexion: 25 degrees  with pain  Left rotation:  WFL  Right rotation: 15 degrees  with pain    Additional Active Range of Motion Details  Pt c/o localized right PSIS pain with lumbar extension, right side-bending and right rotation  Joint Play   Joints within functional limits: T12, L1, L2, L3, L4, L5 and S1     Strength/Myotome Testing     Left Hip   Planes of Motion   Flexion: 5  Extension: 5  Abduction: 4+  Adduction: 5  External rotation: 5  Internal rotation: 5    Right Hip   Planes of Motion   Flexion: 4+  Extension: 3+  Abduction: 3+  Adduction: 4+  External rotation: 4-  Internal rotation: 4-    Left Knee   Flexion: 5  Extension: 5    Right Knee   Extension: 5    Left Ankle/Foot   Dorsiflexion: 5  Plantar flexion: 5    Right Ankle/Foot   Dorsiflexion: 5  Plantar flexion: 5    Tests     Lumbar     Left   Negative passive SLR and slump test      Right   Negative passive SLR and slump test      Left Hip   Negative ETHAN and FADIR  Modified Zach: Positive  90/90 SLR: Positive  Right Hip   Positive ETHAN  Negative FADIR  Modified Zach: Positive  90/90 SLR: Positive       Additional Tests Details  SIJ Cluster: + R PI    Core Strength: Fair             Precautions: none     Daily Treatment Diary      Assessment  12/8                     Mireya/Shimon NEGRETE  53/67       **         **   Manuals    MET R PI                        R LP Mob                          Exercise Diary     Bike w/MHP                        HS Stretch - LS                        HF Stretch - OET                        PPT                        PPT + PBall Press                        PPT + Iso Hip Flex                        PPT + Low Bridge PPT + Iso Hip ADD                        TB Clamshell                        TB Reverse Clamshell                                                                                                                                                                       Modalities    MHP L-Spine

## 2022-12-13 ENCOUNTER — OFFICE VISIT (OUTPATIENT)
Dept: PHYSICAL THERAPY | Facility: REHABILITATION | Age: 47
End: 2022-12-13

## 2022-12-13 DIAGNOSIS — M54.50 CHRONIC RIGHT-SIDED LOW BACK PAIN WITHOUT SCIATICA: Primary | ICD-10-CM

## 2022-12-13 DIAGNOSIS — G89.29 CHRONIC RIGHT-SIDED LOW BACK PAIN WITHOUT SCIATICA: Primary | ICD-10-CM

## 2022-12-13 NOTE — PROGRESS NOTES
Daily Note     Today's date: 2022  Patient name: Claudean Punch  : 1975  MRN: [id number]  Referring provider: Stephanie Sharpe MD  Dx:   Encounter Diagnosis     ICD-10-CM    1  Chronic right-sided low back pain without sciatica  M54 50     G89 29                      Subjective: Patient reports no change since IE  6/10 LBP this AM        Objective: See treatment diary below    EDU on DOMS, rest/recovery  Assessment: Tolerated treatment well  Irritiation of sx with EOT HF stretch (watch APT), Zach stretch was better tolerated  Generalized fatigue with core stability tasks, but demonstrating good activation and coordination with breathing  Intermittent cuing to correct form with clamshells  Overall, good response to current set of interventions  Some reduction of LBP by the end of today's session, 5/10  Plan: Continue per plan of care         Precautions: none     Daily Treatment Diary      Assessment                     Mireya/Shimon MCBRIDE                     FOTO  53/67       **         **   Manuals    MET R PI                        R LP Mob                          Exercise Diary     Bike w/MHP    6' lvl 0                    HS Stretch - LS    3x30"                    HF Stretch - OET    3x20"  Zach                    PPT    15x5"                    PPT + PBall Press    10x5"                    PPT + Iso Hip Flex    10x5" B/L                    PPT + Low Bridge    2x10                    PPT + Iso Hip ADD    15x5"                    TB Clamshell    OTB 2x10 B/L                    TB Reverse Clamshell    OTB 2x10 B/L                                                                                                                                                                   Modalities    MHP L-Spine

## 2022-12-16 ENCOUNTER — APPOINTMENT (OUTPATIENT)
Dept: PHYSICAL THERAPY | Facility: REHABILITATION | Age: 47
End: 2022-12-16

## 2022-12-19 ENCOUNTER — OFFICE VISIT (OUTPATIENT)
Dept: PHYSICAL THERAPY | Facility: REHABILITATION | Age: 47
End: 2022-12-19

## 2022-12-19 DIAGNOSIS — M54.50 CHRONIC RIGHT-SIDED LOW BACK PAIN WITHOUT SCIATICA: Primary | ICD-10-CM

## 2022-12-19 DIAGNOSIS — G89.29 CHRONIC RIGHT-SIDED LOW BACK PAIN WITHOUT SCIATICA: Primary | ICD-10-CM

## 2022-12-19 NOTE — PROGRESS NOTES
Daily Note     Today's date: 2022  Patient name: Tate Webb  : 1975  MRN: [id number]  Referring provider: Haven Conn MD  Dx:   Encounter Diagnosis     ICD-10-CM    1  Chronic right-sided low back pain without sciatica  M54 50     G89 29                      Subjective: Patient reports she didn't get a chance to trial her home exercises yet because she didn't get pictures at the last session  She reports her back pain is relatively unchanged  Objective: See treatment diary below  Pt issued updated HEP to which she demonstrated and verbalized understanding  Assessment: Pt with good tolerance to progression of program with appropriate challenge and fatigue without increase in low back pain  Pt required moderate verbal and manual cues to ensure proper performance of all exercises  Will continue to progress program as able  Pt will benefit from continued skilled PT intervention in order to address her remaining limitations and to restore maximal function  Plan: Continue per plan of care       Precautions: none     Daily Treatment Diary      Assessment                   Mireya/Shimon MCBRIDE                     FOTO  53/67       **         **   Manuals    MET R PI                        R LP Mob                          Exercise Diary     Bike w/MHP    6' lvl 0  L1x6'                  HS Stretch - LS    3x30"  30"x3 ea                  HF Stretch - OET    3x20"  Zach  30"x3 ea                  PPT    15x5"  10"x10                  PPT + PBall Press    10x5"  10"x10                  PPT + Iso Hip Flex    10x5" B/L  5"  1x10 ea                  PPT + Low Bridge    2x10  10"  1x10                  PPT + Iso Hip ADD    15x5"  10"  1x10                  TB Clamshell    OTB 2x10 B/L  Orange  5"  1x10 ea                  TB Reverse Clamshell    OTB 2x10 B/L  Orange  5"  1x10 ea Modalities    MHP L-Spine      6' w/Bike

## 2022-12-21 ENCOUNTER — OFFICE VISIT (OUTPATIENT)
Dept: PHYSICAL THERAPY | Facility: REHABILITATION | Age: 47
End: 2022-12-21

## 2022-12-21 DIAGNOSIS — M54.50 CHRONIC RIGHT-SIDED LOW BACK PAIN WITHOUT SCIATICA: Primary | ICD-10-CM

## 2022-12-21 DIAGNOSIS — G89.29 CHRONIC RIGHT-SIDED LOW BACK PAIN WITHOUT SCIATICA: Primary | ICD-10-CM

## 2022-12-21 NOTE — PROGRESS NOTES
Daily Note     Today's date: 2022  Patient name: Armani Hanson  : 1975  MRN: [id number]  Referring provider: Ericka Shahid MD  Dx:   Encounter Diagnosis     ICD-10-CM    1  Chronic right-sided low back pain without sciatica  M54 50     G89 29                      Subjective: Patient reports to therapy stating she was able to complete HEP since lv and has decreased low back pain  Objective: See treatment diary below  Assessment: Pt tolerated treatment well  continued occ verbal and manual cues throughout TE completion with improved carryover this visit  No soreness or increased pain reported during or after treatment  Pt appropriately challenged with exercises, will progress as tolerated in upcoming visits  Pt would benefit from continued skilled PT to improve current deficits and maximize function  Plan: Continue per plan of care       Precautions: none     Daily Treatment Diary      Assessment                 Mireya/Shimon NEGRETE  53/67       **         **   Manuals    MET R PI                        R LP Mob                          Exercise Diary     Bike w/MHP    6' lvl 0  L1x6'  L1x6'                HS Stretch - LS    3x30"  30"x3 ea  30"x3 ea                HF Stretch - OET    3x20"  Zach  30"x3 ea  30"x3 ea                PPT    15x5"  10"x10  10"x10                PPT + PBall Press    10x5"  10"x10  10"x10                PPT + Iso Hip Flex    10x5" B/L  5"  1x10 ea  5"  1x10 ea                PPT + Low Bridge    2x10  10"  1x10  10"x10                PPT + Iso Hip ADD    15x5"  10"  1x10  10"x10                TB Clamshell    OTB 2x10 B/L  Orange  5"  1x10 ea  OTB  5"x10                TB Reverse Clamshell    OTB 2x10 B/L  Orange  5"  1x10 ea  OTB  5"x10                                                                                                                                                               Modalities    MHP L-Spine 6' w/Bike  6' w/bike

## 2022-12-22 ENCOUNTER — APPOINTMENT (OUTPATIENT)
Dept: PHYSICAL THERAPY | Facility: REHABILITATION | Age: 47
End: 2022-12-22

## 2022-12-27 ENCOUNTER — OFFICE VISIT (OUTPATIENT)
Dept: PHYSICAL THERAPY | Facility: REHABILITATION | Age: 47
End: 2022-12-27

## 2022-12-27 DIAGNOSIS — G89.29 CHRONIC RIGHT-SIDED LOW BACK PAIN WITHOUT SCIATICA: Primary | ICD-10-CM

## 2022-12-27 DIAGNOSIS — M54.50 CHRONIC RIGHT-SIDED LOW BACK PAIN WITHOUT SCIATICA: Primary | ICD-10-CM

## 2022-12-27 NOTE — PROGRESS NOTES
Daily Note     Today's date: 2022  Patient name: Gerald Gustafson  : 1975  MRN: [id number]  Referring provider: Haven Carvajal MD  Dx:   Encounter Diagnosis     ICD-10-CM    1  Chronic right-sided low back pain without sciatica  M54 50     G89 29                      Subjective: Patient reports her pain levels are continuing to improve  Objective: See treatment diary below  Assessment: Pt tolerated treatment well  Required manual assistance for proper positioning to complete EOT HF stretch  Manual and verbal cues for proper PPT and maintaining positioning during exercises  Pt demonstrates core fatigue with TE  Pt would benefit from continued skilled PT to improve current deficits and maximize function  Plan: Continue per plan of care  Update HEP to include clamshell/rev clamshell nv       Precautions: none     Daily Treatment Diary      Assessment               Mireya/Shimon MCBRIDE                     FOTO  53/67       **  **       **   Manuals    MET R PI                        R LP Mob                          Exercise Diary     Bike w/MHP    6' lvl 0  L1x6'  L1x6'  L1x8'              HS Stretch - LS    3x30"  30"x3 ea  30"x3 ea  30"x3 ea              HF Stretch - OET    3x20"  Zach  30"x3 ea  30"x3 ea  90" ea              PPT    15x5"  10"x10  10"x10  10"x10              PPT + PBall Press    10x5"  10"x10  10"x10  10"x10              PPT + Iso Hip Flex    10x5" B/L  5"  1x10 ea  5"  1x10 ea  5"  1x10 ea              PPT + Low Bridge    2x10  10"  1x10  10"x10  10"x10              PPT + Iso Hip ADD    15x5"  10"  1x10  10"x10  10"x10              TB Clamshell    OTB 2x10 B/L  Orange  5"  1x10 ea  OTB  5"x10  OTB  5"x10              TB Reverse Clamshell    OTB 2x10 B/L  Orange  5"  1x10 ea  OTB  5"x10  OTB  5"x10 Modalities    MHP L-Spine      6' w/Bike  6' w/bike  10' w/bike and TE

## 2022-12-30 ENCOUNTER — APPOINTMENT (OUTPATIENT)
Dept: PHYSICAL THERAPY | Facility: REHABILITATION | Age: 47
End: 2022-12-30

## 2023-04-25 ENCOUNTER — VBI (OUTPATIENT)
Dept: ADMINISTRATIVE | Facility: OTHER | Age: 48
End: 2023-04-25

## 2023-06-22 ENCOUNTER — OFFICE VISIT (OUTPATIENT)
Dept: URGENT CARE | Age: 48
End: 2023-06-22
Payer: COMMERCIAL

## 2023-06-22 VITALS
TEMPERATURE: 97 F | DIASTOLIC BLOOD PRESSURE: 85 MMHG | OXYGEN SATURATION: 97 % | HEART RATE: 84 BPM | RESPIRATION RATE: 18 BRPM | SYSTOLIC BLOOD PRESSURE: 134 MMHG

## 2023-06-22 DIAGNOSIS — R39.9 UTI SYMPTOMS: Primary | ICD-10-CM

## 2023-06-22 LAB
SL AMB  POCT GLUCOSE, UA: ABNORMAL
SL AMB LEUKOCYTE ESTERASE,UA: ABNORMAL
SL AMB POCT BILIRUBIN,UA: ABNORMAL
SL AMB POCT BLOOD,UA: ABNORMAL
SL AMB POCT CLARITY,UA: ABNORMAL
SL AMB POCT COLOR,UA: ABNORMAL
SL AMB POCT KETONES,UA: ABNORMAL
SL AMB POCT NITRITE,UA: ABNORMAL
SL AMB POCT PH,UA: ABNORMAL
SL AMB POCT SPECIFIC GRAVITY,UA: ABNORMAL
SL AMB POCT URINE PROTEIN: ABNORMAL
SL AMB POCT UROBILINOGEN: ABNORMAL

## 2023-06-22 PROCEDURE — 87077 CULTURE AEROBIC IDENTIFY: CPT | Performed by: NURSE PRACTITIONER

## 2023-06-22 PROCEDURE — 87086 URINE CULTURE/COLONY COUNT: CPT | Performed by: NURSE PRACTITIONER

## 2023-06-22 PROCEDURE — 81002 URINALYSIS NONAUTO W/O SCOPE: CPT | Performed by: NURSE PRACTITIONER

## 2023-06-22 PROCEDURE — 99213 OFFICE O/P EST LOW 20 MIN: CPT | Performed by: NURSE PRACTITIONER

## 2023-06-22 PROCEDURE — 87186 SC STD MICRODIL/AGAR DIL: CPT | Performed by: NURSE PRACTITIONER

## 2023-06-22 RX ORDER — NITROFURANTOIN 25; 75 MG/1; MG/1
100 CAPSULE ORAL 2 TIMES DAILY
Qty: 14 CAPSULE | Refills: 0 | Status: SHIPPED | OUTPATIENT
Start: 2023-06-22

## 2023-06-22 NOTE — PROGRESS NOTES
NAME: Thea Carrion is a 41 y o  female  : 1975    MRN: [id number]    /85   Pulse 84   Temp (!) 97 °F (36 1 °C) (Tympanic)   Resp 18   LMP 2023   SpO2 97%     10:34 AM    Assessment and Plan   UTI symptoms [R39 9]  1  UTI symptoms  nitrofurantoin (MACROBID) 100 mg capsule    POCT urine dip    Urine culture          Jenny Rodriguez was seen today for possible uti  Diagnoses and all orders for this visit:    UTI symptoms  -     nitrofurantoin (MACROBID) 100 mg capsule; Take 1 capsule (100 mg total) by mouth 2 (two) times a day  -     POCT urine dip  -     Urine culture        Patient Instructions   Patient Instructions   Urine dip was positive for infection today and sent for a culture,   Call in two to three days for urine culture results    Don't wear contacts when taking AZO OTC or pyridium perscription due to side effects  Increased fluids  Take meds as directed     Empty bladder often  Follow up with pcp  If symptoms get worse, go to ER for further evaluation  Proceed to the nearest ER if symptoms worsen, Follow up with your PCP  Continue to social distance, wash your hands, and wear your masks  Please continue to follow the CDC  gov guidelines daily for they are subject to change on COVID-19    Chief Complaint     Chief Complaint   Patient presents with   • Possible UTI     Pt c/o urinary urgency, cloudy urine  Sx since yesterday  Taking urostat, without much relief  Took urostat this morning  History of Present Illness     51-year-old female here today with increased urgency and frequency and cloudy urine  Her symptoms started yesterday she started taking over-the-counter Uristat without much relief  With her family doctor/OB/GYN and did not get a response came in to be seen  Patient had intercourse 2 days ago and does recall not urinating after intercourse and believes as where she obtained the UTI    No blood noted in urine per patient          Review of Systems Review of Systems   Constitutional: Negative  HENT: Negative  Eyes: Negative  Respiratory: Negative  Cardiovascular: Negative  Gastrointestinal: Positive for abdominal pain (suprapubic)  Genitourinary: Positive for dysuria, frequency and urgency  Negative for difficulty urinating, enuresis, flank pain, hematuria, vaginal bleeding, vaginal discharge and vaginal pain  Musculoskeletal: Negative  Skin: Negative  Psychiatric/Behavioral: Negative  Current Medications       Current Outpatient Medications:   •  drospirenone-ethinyl estradiol (EMERY) 3-0 03 MG per tablet, Take 1 tablet by mouth daily, Disp: , Rfl:   •  nitrofurantoin (MACROBID) 100 mg capsule, Take 1 capsule (100 mg total) by mouth 2 (two) times a day, Disp: 14 capsule, Rfl: 0  •  cyclobenzaprine (FLEXERIL) 10 mg tablet, Take 1 tablet (10 mg total) by mouth 3 (three) times a day as needed for muscle spasms (Patient not taking: Reported on 6/22/2023), Disp: 30 tablet, Rfl: 0    Current Allergies     Allergies as of 06/22/2023 - Reviewed 06/22/2023   Allergen Reaction Noted   • [place] (diagnostic) - food allergy Other (See Comments) 07/02/2015   • Pollen extract Other (See Comments) 07/02/2015   • Tree extract  11/11/2019   • Penicillins Rash 06/06/2012              History reviewed  No pertinent past medical history  Past Surgical History:   Procedure Laterality Date   • CERVICAL BIOPSY  W/ LOOP ELECTRODE EXCISION         Family History   Problem Relation Age of Onset   • Leukemia Brother    • Diabetes Paternal Grandmother    • Dementia Paternal Grandmother          Medications have been verified      The following portions of the patient's history were reviewed and updated as appropriate: allergies, current medications, past family history, past medical history, past social history, past surgical history and problem list     Objective   /85   Pulse 84   Temp (!) 97 °F (36 1 °C) (Tympanic)   Resp 18   LMP "06/01/2023   SpO2 97%      Physical Exam     Physical Exam  Constitutional:       Appearance: Normal appearance  She is well-developed  HENT:      Nose: Nose normal       Mouth/Throat:      Pharynx: No oropharyngeal exudate or posterior oropharyngeal erythema  Eyes:      Pupils: Pupils are equal, round, and reactive to light  Cardiovascular:      Rate and Rhythm: Normal rate and regular rhythm  Pulses: Normal pulses  Heart sounds: Normal heart sounds  No murmur heard  Pulmonary:      Effort: Pulmonary effort is normal  No respiratory distress  Breath sounds: Normal breath sounds  Abdominal:      Palpations: Abdomen is soft  Tenderness: There is abdominal tenderness in the suprapubic area  There is no right CVA tenderness or left CVA tenderness  Musculoskeletal:         General: Normal range of motion  Skin:     General: Skin is warm  Capillary Refill: Capillary refill takes less than 2 seconds  Findings: No rash  Neurological:      General: No focal deficit present  Mental Status: She is alert and oriented to person, place, and time  Psychiatric:         Mood and Affect: Mood normal                Note: Portions of this record may have been created with voice recognition software  Occasional wrong word or \"sound a like\" substitutions may have occurred due to the inherent limitations of voice recognition software  Please read the chart carefully and recognize, using context, where substitutions have occurred  RENEE Francisco  "

## 2023-06-22 NOTE — LETTER
June 22, 2023     Patient: Oksana Meza  YOB: 1975  Date of Visit: 6/22/2023      To Whom it May Concern:    Leoncio Lugo is under my professional care  Abel Bardales was seen in my office on 6/22/2023          Sincerely,          RENEE Belcher        CC: No Recipients

## 2023-06-24 LAB — BACTERIA UR CULT: ABNORMAL

## 2024-05-24 ENCOUNTER — OFFICE VISIT (OUTPATIENT)
Dept: URGENT CARE | Age: 49
End: 2024-05-24
Payer: COMMERCIAL

## 2024-05-24 VITALS
SYSTOLIC BLOOD PRESSURE: 118 MMHG | TEMPERATURE: 98.1 F | DIASTOLIC BLOOD PRESSURE: 78 MMHG | OXYGEN SATURATION: 98 % | HEIGHT: 67 IN | RESPIRATION RATE: 18 BRPM | HEART RATE: 91 BPM | BODY MASS INDEX: 21.03 KG/M2 | WEIGHT: 134 LBS

## 2024-05-24 DIAGNOSIS — R22.0 FACIAL SWELLING: Primary | ICD-10-CM

## 2024-05-24 DIAGNOSIS — R51.9 FACIAL PAIN: ICD-10-CM

## 2024-05-24 PROCEDURE — 99214 OFFICE O/P EST MOD 30 MIN: CPT | Performed by: EMERGENCY MEDICINE

## 2024-05-24 RX ORDER — AMOXICILLIN AND CLAVULANATE POTASSIUM 875; 125 MG/1; MG/1
1 TABLET, FILM COATED ORAL 2 TIMES DAILY
Qty: 14 TABLET | Refills: 0 | Status: SHIPPED | OUTPATIENT
Start: 2024-05-24 | End: 2024-05-31

## 2024-05-24 RX ORDER — CLINDAMYCIN HYDROCHLORIDE 300 MG/1
300 CAPSULE ORAL 3 TIMES DAILY
Qty: 21 CAPSULE | Refills: 0 | Status: SHIPPED | OUTPATIENT
Start: 2024-05-24 | End: 2024-05-31

## 2024-05-24 RX ORDER — NAPROXEN 500 MG/1
500 TABLET ORAL 2 TIMES DAILY WITH MEALS
Qty: 60 TABLET | Refills: 0 | Status: SHIPPED | OUTPATIENT
Start: 2024-05-24 | End: 2024-06-23

## 2024-05-24 NOTE — PROGRESS NOTES
[facility]        NAME: Melissa Mccall is a 48 y.o. female  : 1975    MRN: [id number]  DATE: May 24, 2024  TIME: 12:13 PM    Assessment and Plan   Facial swelling [R22.0]  1. Facial swelling  amoxicillin-clavulanate (AUGMENTIN) 875-125 mg per tablet    clindamycin (CLEOCIN) 300 MG capsule      2. Facial pain  naproxen (Naprosyn) 500 mg tablet      There is very mild, left-sided infraorbital as well as perinasal swelling without erythema, fluctuance, crepitus or induration.  Symptoms could be allergy mediated, or could be early sinusitis/periorbital cellulitis.  Will cover patient for bacterial rhinosinusitis and periorbital cellulitis at this time with Augmentin and clindamycin.  Patient is otherwise afebrile in clinic, nontoxic-appearing without visual changes or pain with extraocular movements.  She is without focal logical deficit.  Advised patient to seek care in ED if symptoms persist or worsen, otherwise follow-up closely with her PCP as directed.  All questions were answered at bedside, patient was amenable to plan and voiced understanding.      Patient Instructions       Follow up with PCP in 3-5 days.  Proceed to  ER if symptoms worsen.    If tests have been performed at [facility], our office will contact you with results if changes need to be made to the care plan discussed with you at the visit.  [facility]    Chief Complaint     Chief Complaint   Patient presents with    Nasal Pain     Pt states she had a tooth filled on Monday right top teeth toward the back.  Tuesday she developed a reddened swollen nose that is warm to touch.  Pt states the swelling has now moved over to left eye.           History of Present Illness       48-year-old female with history of allergic rhinitis, migraine headaches, basal cell carcinoma, depression, anxiety presents with chief complaint of left-sided periorbital and perinasal swelling which began on Tuesday,  approximately 3 days ago.  Patient states that on Monday she had a filling performed on her right rear upper maxillary molar.  She denies any dentalgia, trismus, stridor or drooling.  She additionally denies any fevers, pain with extraocular movements, or visual changes.  Denies rigors or vomiting.  States that she noticed some mild swelling to the aforementioned areas of the last several days which she feels is getting worse.  She denies any recent nasal or facial trauma, falls or head strike.  She additionally denies headaches, speech changes or focal numbness tingling or weakness in her extremities.  She is endorsing some worsening nasal congestion as well as bifrontal and maxillary sinus pressure.    Other  This is a new problem. The current episode started in the past 7 days. The problem occurs constantly. The problem has been waxing and waning. Pertinent negatives include no abdominal pain, anorexia, arthralgias, change in bowel habit, chest pain, chills, congestion, coughing, diaphoresis, fatigue, fever, headaches, joint swelling, myalgias, nausea, neck pain, numbness, rash, sore throat, swollen glands, urinary symptoms, vertigo, visual change, vomiting or weakness. Nothing aggravates the symptoms. She has tried nothing for the symptoms.       Review of Systems   Review of Systems   Constitutional:  Negative for activity change, appetite change, chills, diaphoresis, fatigue, fever and unexpected weight change.   HENT:  Positive for facial swelling. Negative for congestion, dental problem, drooling, ear discharge, ear pain, hearing loss, mouth sores, nosebleeds, postnasal drip, rhinorrhea, sinus pressure, sinus pain, sneezing, sore throat, tinnitus, trouble swallowing and voice change.    Eyes:  Negative for photophobia, pain, discharge, redness, itching and visual disturbance.   Respiratory:  Negative for apnea, cough, choking, chest tightness, shortness of breath, wheezing and stridor.    Cardiovascular:   Negative for chest pain, palpitations and leg swelling.   Gastrointestinal:  Negative for abdominal distention, abdominal pain, anal bleeding, anorexia, blood in stool, change in bowel habit, constipation, diarrhea, nausea, rectal pain and vomiting.   Musculoskeletal:  Negative for arthralgias, back pain, gait problem, joint swelling, myalgias, neck pain and neck stiffness.   Skin:  Negative for rash.   Neurological:  Negative for dizziness, vertigo, tremors, seizures, syncope, facial asymmetry, speech difficulty, weakness, light-headedness, numbness and headaches.         Current Medications       Current Outpatient Medications:     amoxicillin-clavulanate (AUGMENTIN) 875-125 mg per tablet, Take 1 tablet by mouth 2 (two) times a day for 7 days, Disp: 14 tablet, Rfl: 0    clindamycin (CLEOCIN) 300 MG capsule, Take 1 capsule (300 mg total) by mouth 3 (three) times a day for 7 days, Disp: 21 capsule, Rfl: 0    drospirenone-ethinyl estradiol (EMERY) 3-0.03 MG per tablet, Take 1 tablet by mouth daily, Disp: , Rfl:     naproxen (Naprosyn) 500 mg tablet, Take 1 tablet (500 mg total) by mouth 2 (two) times a day with meals, Disp: 60 tablet, Rfl: 0    cyclobenzaprine (FLEXERIL) 10 mg tablet, Take 1 tablet (10 mg total) by mouth 3 (three) times a day as needed for muscle spasms (Patient not taking: Reported on 6/22/2023), Disp: 30 tablet, Rfl: 0    nitrofurantoin (MACROBID) 100 mg capsule, Take 1 capsule (100 mg total) by mouth 2 (two) times a day (Patient not taking: Reported on 5/24/2024), Disp: 14 capsule, Rfl: 0    Current Allergies     Allergies as of 05/24/2024 - Reviewed 05/24/2024   Allergen Reaction Noted    [place] (diagnostic) - food allergy Other (See Comments) 07/02/2015    Pollen extract Other (See Comments) 07/02/2015    Tree extract  11/11/2019            The following portions of the patient's history were reviewed and updated as appropriate: allergies, current medications, past family history, past medical  "history, past social history, past surgical history and problem list.     History reviewed. No pertinent past medical history.    Past Surgical History:   Procedure Laterality Date    CERVICAL BIOPSY  W/ LOOP ELECTRODE EXCISION         Family History   Problem Relation Age of Onset    Leukemia Brother     Diabetes Paternal Grandmother     Dementia Paternal Grandmother          Medications have been verified.        Objective   /78 (BP Location: Left arm, Patient Position: Sitting)   Pulse 91   Temp 98.1 °F (36.7 °C) (Tympanic)   Resp 18   Ht 5' 7\" (1.702 m)   Wt 60.8 kg (134 lb)   LMP 05/03/2024 (Approximate)   SpO2 98%   BMI 20.99 kg/m²   Patient's last menstrual period was 05/03/2024 (approximate).       Physical Exam     Physical Exam  Vitals and nursing note reviewed.   Constitutional:       General: She is not in acute distress.     Appearance: Normal appearance. She is not ill-appearing, toxic-appearing or diaphoretic.   HENT:      Head: Normocephalic and atraumatic.      Right Ear: Tympanic membrane, ear canal and external ear normal. There is no impacted cerumen.      Left Ear: Tympanic membrane, ear canal and external ear normal. There is no impacted cerumen.      Nose: Septal deviation, nasal tenderness, mucosal edema and congestion present. No signs of injury or rhinorrhea.      Right Nostril: No foreign body, epistaxis, septal hematoma or occlusion.      Left Nostril: No foreign body, epistaxis, septal hematoma or occlusion.      Right Turbinates: Enlarged and swollen.      Left Turbinates: Enlarged and swollen.      Right Sinus: Maxillary sinus tenderness and frontal sinus tenderness present.      Left Sinus: Maxillary sinus tenderness and frontal sinus tenderness present.      Mouth/Throat:      Mouth: Mucous membranes are moist.      Pharynx: No oropharyngeal exudate or posterior oropharyngeal erythema.   Eyes:      General: Vision grossly intact. Gaze aligned appropriately. No allergic " shiner, visual field deficit or scleral icterus.        Right eye: No foreign body, discharge or hordeolum.         Left eye: No foreign body, discharge or hordeolum.      Extraocular Movements: Extraocular movements intact.      Right eye: Normal extraocular motion and no nystagmus.      Left eye: Normal extraocular motion and no nystagmus.      Conjunctiva/sclera:      Right eye: Right conjunctiva is not injected. No chemosis, exudate or hemorrhage.     Left eye: Left conjunctiva is injected. No chemosis, exudate or hemorrhage.     Pupils: Pupils are equal, round, and reactive to light.        Comments: No pain with extraocular movements, APD or proptosis noted   Cardiovascular:      Rate and Rhythm: Normal rate and regular rhythm.      Pulses: Normal pulses.           Carotid pulses are 2+ on the right side and 2+ on the left side.       Radial pulses are 2+ on the right side and 2+ on the left side.      Heart sounds: No murmur heard.     No friction rub. No gallop.   Pulmonary:      Effort: Pulmonary effort is normal. No respiratory distress.      Breath sounds: Normal breath sounds. No stridor. No wheezing, rhonchi or rales.   Chest:      Chest wall: No tenderness.   Abdominal:      General: Abdomen is flat. There is no distension.      Palpations: Abdomen is soft. There is no mass.      Tenderness: There is no abdominal tenderness. There is no right CVA tenderness, left CVA tenderness, guarding or rebound.      Hernia: No hernia is present.   Musculoskeletal:         General: No swelling, tenderness, deformity or signs of injury.      Cervical back: Normal range of motion and neck supple.      Right lower leg: No edema.      Left lower leg: No edema.   Skin:     General: Skin is warm and dry.      Capillary Refill: Capillary refill takes less than 2 seconds.      Coloration: Skin is not jaundiced or pale.      Findings: No bruising, erythema, lesion or rash.   Neurological:      General: No focal deficit  present.      Mental Status: She is alert and oriented to person, place, and time.      Cranial Nerves: No cranial nerve deficit.      Sensory: No sensory deficit.      Motor: No weakness.      Coordination: Coordination normal.      Gait: Gait normal.   Psychiatric:         Mood and Affect: Mood normal.         Behavior: Behavior normal.

## 2024-08-22 ENCOUNTER — VBI (OUTPATIENT)
Dept: ADMINISTRATIVE | Facility: OTHER | Age: 49
End: 2024-08-22

## 2024-08-22 NOTE — TELEPHONE ENCOUNTER
08/22/24 9:11 AM     Chart reviewed for [facility]: Colonoscopy ; nothing is submitted to the patient's insurance at this time.     Eva Barron   [facility]

## 2025-06-18 ENCOUNTER — VBI (OUTPATIENT)
Dept: ADMINISTRATIVE | Facility: OTHER | Age: 50
End: 2025-06-18

## 2025-06-18 NOTE — TELEPHONE ENCOUNTER
06/18/25 9:00 AM     Chart reviewed for [facility]: Colonoscopy ; nothing is submitted to the patient's insurance at this time.     Gm Salamanca MA   [facility]

## 2025-07-02 ENCOUNTER — OFFICE VISIT (OUTPATIENT)
Dept: FAMILY MEDICINE CLINIC | Facility: CLINIC | Age: 50
End: 2025-07-02
Payer: COMMERCIAL

## 2025-07-02 ENCOUNTER — TELEPHONE (OUTPATIENT)
Dept: ADMINISTRATIVE | Facility: OTHER | Age: 50
End: 2025-07-02

## 2025-07-02 VITALS
RESPIRATION RATE: 16 BRPM | SYSTOLIC BLOOD PRESSURE: 122 MMHG | HEART RATE: 84 BPM | DIASTOLIC BLOOD PRESSURE: 80 MMHG | HEIGHT: 65 IN | OXYGEN SATURATION: 97 % | TEMPERATURE: 96.9 F | WEIGHT: 129.6 LBS | BODY MASS INDEX: 21.59 KG/M2

## 2025-07-02 DIAGNOSIS — Z11.59 NEED FOR HEPATITIS C SCREENING TEST: ICD-10-CM

## 2025-07-02 DIAGNOSIS — Z11.1 SCREENING FOR TUBERCULOSIS: ICD-10-CM

## 2025-07-02 DIAGNOSIS — Z12.12 SCREENING FOR COLORECTAL CANCER: ICD-10-CM

## 2025-07-02 DIAGNOSIS — Z83.49 FAMILY HISTORY OF HYPOTHYROIDISM: ICD-10-CM

## 2025-07-02 DIAGNOSIS — Z11.4 SCREENING FOR HIV (HUMAN IMMUNODEFICIENCY VIRUS): ICD-10-CM

## 2025-07-02 DIAGNOSIS — Z12.11 SCREENING FOR COLORECTAL CANCER: ICD-10-CM

## 2025-07-02 DIAGNOSIS — M79.645 PAIN OF FINGER OF LEFT HAND: ICD-10-CM

## 2025-07-02 DIAGNOSIS — Z00.00 ANNUAL PHYSICAL EXAM: ICD-10-CM

## 2025-07-02 DIAGNOSIS — Z13.220 SCREENING FOR HYPERLIPIDEMIA: ICD-10-CM

## 2025-07-02 DIAGNOSIS — R73.01 IFG (IMPAIRED FASTING GLUCOSE): ICD-10-CM

## 2025-07-02 DIAGNOSIS — H10.13 ALLERGIC CONJUNCTIVITIS OF BOTH EYES: Primary | ICD-10-CM

## 2025-07-02 DIAGNOSIS — Z23 NEED FOR TDAP VACCINATION: ICD-10-CM

## 2025-07-02 DIAGNOSIS — Z78.9 MEASLES, MUMPS, RUBELLA (MMR) VACCINATION STATUS UNKNOWN: ICD-10-CM

## 2025-07-02 DIAGNOSIS — Z78.9 HEPATITIS B VACCINATION STATUS UNKNOWN: ICD-10-CM

## 2025-07-02 DIAGNOSIS — Z78.9 VARICELLA VACCINATION STATUS UNKNOWN: ICD-10-CM

## 2025-07-02 DIAGNOSIS — Z23 ENCOUNTER FOR IMMUNIZATION: ICD-10-CM

## 2025-07-02 PROBLEM — U07.1 COVID-19: Status: RESOLVED | Noted: 2022-01-12 | Resolved: 2025-07-02

## 2025-07-02 PROCEDURE — 90715 TDAP VACCINE 7 YRS/> IM: CPT

## 2025-07-02 PROCEDURE — 99396 PREV VISIT EST AGE 40-64: CPT | Performed by: FAMILY MEDICINE

## 2025-07-02 PROCEDURE — 90471 IMMUNIZATION ADMIN: CPT

## 2025-07-02 PROCEDURE — 99213 OFFICE O/P EST LOW 20 MIN: CPT | Performed by: FAMILY MEDICINE

## 2025-07-02 RX ORDER — OLOPATADINE HYDROCHLORIDE 1 MG/ML
1 SOLUTION OPHTHALMIC 2 TIMES DAILY
Qty: 5 ML | Refills: 0 | Status: SHIPPED | OUTPATIENT
Start: 2025-07-02

## 2025-07-02 RX ORDER — LORATADINE 10 MG/1
10 TABLET ORAL DAILY
Qty: 30 TABLET | Refills: 5 | Status: SHIPPED | OUTPATIENT
Start: 2025-07-02

## 2025-07-02 NOTE — PATIENT INSTRUCTIONS
"Patient Education     Routine physical for adults   The Basics   Written by the doctors and editors at [facility]   What is a physical? -- A physical is a routine visit, or \"check-up,\" with your doctor. You might also hear it called a \"wellness visit\" or \"preventive visit.\"  During each visit, the doctor will:   Ask about your physical and mental health   Ask about your habits, behaviors, and lifestyle   Do an exam   Give you vaccines if needed   Talk to you about any medicines you take   Give advice about your health   Answer your questions  Getting regular check-ups is an important part of taking care of your health. It can help your doctor find and treat any problems you have. But it's also important for preventing health problems.  A routine physical is different from a \"sick visit.\" A sick visit is when you see a doctor because of a health concern or problem. Since physicals are scheduled ahead of time, you can think about what you want to ask the doctor.  How often should I get a physical? -- It depends on your age and health. In general, for people age 21 years and older:   If you are younger than 50 years, you might be able to get a physical every 3 years.   If you are 50 years or older, your doctor might recommend a physical every year.  If you have an ongoing health condition, like diabetes or high blood pressure, your doctor will probably want to see you more often.  What happens during a physical? -- In general, each visit will include:   Physical exam - The doctor or nurse will check your height, weight, heart rate, and blood pressure. They will also look at your eyes and ears. They will ask about how you are feeling and whether you have any symptoms that bother you.   Medicines - It's a good idea to bring a list of all the medicines you take to each doctor visit. Your doctor will talk to you about your medicines and answer any questions. Tell them if you are having any side effects that bother you. You " "should also tell them if you are having trouble paying for any of your medicines.   Habits and behaviors - This includes:   Your diet   Your exercise habits   Whether you smoke, drink alcohol, or use drugs   Whether you are sexually active   Whether you feel safe at home  Your doctor will talk to you about things you can do to improve your health and lower your risk of health problems. They will also offer help and support. For example, if you want to quit smoking, they can give you advice and might prescribe medicines. If you want to improve your diet or get more physical activity, they can help you with this, too.   Lab tests, if needed - The tests you get will depend on your age and situation. For example, your doctor might want to check your:   Cholesterol   Blood sugar   Iron level   Vaccines - The recommended vaccines will depend on your age, health, and what vaccines you already had. Vaccines are very important because they can prevent certain serious or deadly infections.   Discussion of screening - \"Screening\" means checking for diseases or other health problems before they cause symptoms. Your doctor can recommend screening based on your age, risk, and preferences. This might include tests to check for:   Cancer, such as breast, prostate, cervical, ovarian, colorectal, prostate, lung, or skin cancer   Sexually transmitted infections, such as chlamydia and gonorrhea   Mental health conditions like depression and anxiety  Your doctor will talk to you about the different types of screening tests. They can help you decide which screenings to have. They can also explain what the results might mean.   Answering questions - The physical is a good time to ask the doctor or nurse questions about your health. If needed, they can refer you to other doctors or specialists, too.  Adults older than 65 years often need other care, too. As you get older, your doctor will talk to you about:   How to prevent falling at " home   Hearing or vision tests   Memory testing   How to take your medicines safely   Making sure that you have the help and support you need at home  All topics are updated as new evidence becomes available and our peer review process is complete.  This topic retrieved from [employer] on: May 02, 2024.  Topic 213754 Version 1.0  Release: 32.4.3 - C32.122  © 2024 UpToDate, Inc. and/or its affiliates. All rights reserved.  Consumer Information Use and Disclaimer   Disclaimer: This generalized information is a limited summary of diagnosis, treatment, and/or medication information. It is not meant to be comprehensive and should be used as a tool to help the user understand and/or assess potential diagnostic and treatment options. It does NOT include all information about conditions, treatments, medications, side effects, or risks that may apply to a specific patient. It is not intended to be medical advice or a substitute for the medical advice, diagnosis, or treatment of a health care provider based on the health care provider's examination and assessment of a patient's specific and unique circumstances. Patients must speak with a health care provider for complete information about their health, medical questions, and treatment options, including any risks or benefits regarding use of medications. This information does not endorse any treatments or medications as safe, effective, or approved for treating a specific patient. UpToDate, Inc. and its affiliates disclaim any warranty or liability relating to this information or the use thereof.The use of this information is governed by the Terms of Use, available at https://www.[employer].com/en/know/clinical-effectiveness-terms. 2024© UpToDate, Inc. and its affiliates and/or licensors. All rights reserved.  Copyright   © 2024 UpToDate, Inc. and/or its affiliates. All rights reserved.

## 2025-07-02 NOTE — ASSESSMENT & PLAN NOTE
Orders:    olopatadine (PATANOL) 0.1 % ophthalmic solution; Administer 1 drop to both eyes 2 (two) times a day    loratadine (CLARITIN) 10 mg tablet; Take 1 tablet (10 mg total) by mouth daily

## 2025-07-02 NOTE — TELEPHONE ENCOUNTER
----- Message from Tone ESCALONA sent at 7/1/2025 10:38 AM EDT -----  Regarding: Care Gap Request  07/01/25 10:38 AM    Hello, our patient Melissa Mccall has had Pap Smear (HPV) aka Cervical Cancer Screening completed/performed. Please assist in updating the patient chart by pulling the Care Everywhere (CE) document. The date of service is 12/22/2023.     Thank you,  Tone Lagunas MA  [facility]

## 2025-07-02 NOTE — PROGRESS NOTES
Adult Annual Physical  Name: Melissa Mccall      : 1975      MRN: [id number]  Encounter Provider: Jasen Xie MD  Encounter Date: 2025   Encounter department: [facility]    :  Assessment & Plan  Allergic conjunctivitis of both eyes    Orders:    olopatadine (PATANOL) 0.1 % ophthalmic solution; Administer 1 drop to both eyes 2 (two) times a day    loratadine (CLARITIN) 10 mg tablet; Take 1 tablet (10 mg total) by mouth daily    Pain of finger of left hand    Orders:    Uric acid; Future    XR hand 3+ vw left; Future    Screening for colorectal cancer    Orders:    Cologuard    Screening for HIV (human immunodeficiency virus)    Orders:    HIV 1/2 AG/AB w Reflex [facility] for 2 yr old and above; Future    Need for hepatitis C screening test    Orders:    Hepatitis C Antibody; Future    Screening for hyperlipidemia    Orders:    CBC and differential; Future    Comprehensive metabolic panel; Future    Lipid Panel with Direct LDL reflex; Future    TSH, 3rd generation with Free T4 reflex; Future    Hemoglobin A1C; Future    Uric acid; Future    IFG (impaired fasting glucose)    Orders:    CBC and differential; Future    Comprehensive metabolic panel; Future    Lipid Panel with Direct LDL reflex; Future    TSH, 3rd generation with Free T4 reflex; Future    Hemoglobin A1C; Future    Uric acid; Future    Hepatitis B vaccination status unknown    Orders:    Hepatitis B surface antibody; Future    Measles, mumps, rubella (MMR) vaccination status unknown    Orders:    Rubeola antibody IgG; Future    Mumps antibody, IgG; Future    Rubella antibody, IgG; Future    Screening for tuberculosis    Orders:    Quantiferon TB Gold Plus Assay; Future    Varicella vaccination status unknown    Orders:    Varicella zoster antibody, IgG; Future    Encounter for immunization    Orders:    TDAP VACCINE GREATER THAN OR EQUAL TO 8YO IM    Need for Tdap vaccination    Orders:    TDAP VACCINE GREATER THAN OR EQUAL TO  8YO IM    Family history of hypothyroidism  Daughter has hypothyroidism. Would like to check TSH.   Orders:    TSH, 3rd generation with Free T4 reflex; Future    Annual physical exam           Pap 12/2023 negative per OBGYN.   Mammogram 1/2025 negative.   Refused colonoscopy. OK to do cologuard.       Preventive Screenings:  - Diabetes Screening: risks/benefits discussed and orders placed  - Cholesterol Screening: risks/benefits discussed and orders placed   - Hepatitis C screening: risks/benefits discussed and orders placed   - HIV screening: risks/benefits discussed and orders placed   - Cervical cancer screening: has history of cervical cancer and screening up-to-date   - Breast cancer screening: screening up-to-date   - Colon cancer screening: risks/benefits discussed and orders placed   - Lung cancer screening: screening not indicated     Immunizations:  - Immunizations due: Tdap  - Risks/benefits immunizations discussed    - Immunizations given per orders      Counseling/Anticipatory Guidance:  - Alcohol: discussed moderation in alcohol intake and recommendations for healthy alcohol use.   - Drug use: discussed harms of illicit drug use and how it can negatively impact mental/physical health.   - Tobacco use: discussed harms of tobacco use and management options for quitting.   - Dental health: discussed importance of regular tooth brushing, flossing, and dental visits.   - Diet: discussed recommendations for a healthy/well-balanced diet.   - Exercise: the importance of regular exercise/physical activity was discussed. Recommend exercise 3-5 times per week for at least 30 minutes.   - Injury prevention: discussed safety/seat belts, safety helmets, smoke detectors, carbon monoxide detectors, and smoking near bedding or upholstery.       Depression Screening and Follow-up Plan: Patient was screened for depression during today's encounter. They screened negative with a PHQ-9 score of 0.          History of  Present Illness       Pt is here by herself.     Eyes itchy and allergies for a while.     Left 5th finger swollen and pain for a while.   NO injury.   Worry about gout.     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 Need form filled out.     No smoking.   No alcohol.           Adult Annual Physical:  Patient presents for annual physical.     Diet and Physical Activity:  - Diet/Nutrition: no special diet, well balanced diet, intermittent fasting, consuming 3-5 servings of fruits/vegetables daily, adequate fiber intake and adequate whole grain intake.  - Exercise: no formal exercise, walking, strength training exercises, 3-4 times a week on average and less than 30 minutes on average.    Depression Screening:    - PHQ-9 Score: 0    General Health:  - Sleep: sleeps well and 4-6 hours of sleep on average.  - Hearing: normal hearing bilateral ears.  - Vision: no vision problems and most recent eye exam > 1 year ago.  - Dental: regular dental visits, brushes teeth three times daily and floss regularly.    /GYN Health:  - Follows with GYN: yes.   - Last menstrual cycle: 6/25/2025.   - History of STDs: yes    Advanced Care Planning:  - Has an advanced directive?: no    - Has a durable medical POA?: no      Review of Systems   Constitutional:  Negative for appetite change, chills and fever.   HENT:  Negative for congestion, ear pain, sinus pain and sore throat.    Eyes:  Positive for itching. Negative for discharge.   Respiratory:  Negative for apnea, cough, chest tightness, shortness of breath and wheezing.    Cardiovascular:  Negative for chest pain, palpitations and leg swelling.   Gastrointestinal:  Negative for abdominal pain, anal bleeding, constipation, diarrhea, nausea and vomiting.   Endocrine: Negative for cold intolerance, heat intolerance and polyuria.   Genitourinary:  Negative for difficulty urinating and dysuria.   Musculoskeletal:  Positive for arthralgias. Negative for back pain and  "myalgias.   Skin:  Negative for rash.   Neurological:  Negative for dizziness and headaches.   Psychiatric/Behavioral:  Negative for agitation.          Objective   /80   Pulse 84   Temp (!) 96.9 °F (36.1 °C) (Temporal)   Resp 16   Ht 5' 5.35\" (1.66 m)   Wt 58.8 kg (129 lb 9.6 oz)   LMP 06/25/2025   SpO2 97%   BMI 21.33 kg/m²     Physical Exam  Constitutional:       General: She is not in acute distress.     Appearance: She is well-developed.   HENT:      Head: Normocephalic.     Eyes:      General:         Right eye: No discharge.         Left eye: No discharge.      Conjunctiva/sclera: Conjunctivae normal.     Neck:      Thyroid: No thyromegaly.     Cardiovascular:      Rate and Rhythm: Normal rate and regular rhythm.      Heart sounds: Normal heart sounds. No murmur heard.     No friction rub. No gallop.   Pulmonary:      Effort: Pulmonary effort is normal. No respiratory distress.      Breath sounds: Normal breath sounds. No wheezing or rales.   Chest:      Chest wall: No tenderness.   Abdominal:      General: Bowel sounds are normal. There is no distension.      Palpations: Abdomen is soft. There is no mass.      Tenderness: There is no abdominal tenderness. There is no guarding or rebound.     Musculoskeletal:         General: Swelling and tenderness present. No deformity. Normal range of motion.      Cervical back: Normal range of motion.      Comments: Left 5th finger swollen and tenderness   Lymphadenopathy:      Cervical: No cervical adenopathy.     Neurological:      Mental Status: She is alert.         "

## 2025-07-02 NOTE — TELEPHONE ENCOUNTER
----- Message from Tone ESCALONA sent at 7/2/2025 11:10 AM EDT -----  Regarding: Care Gap Request  07/02/25 11:10 AM    Hello, our patient Melissa Mccall has had Pap Smear (HPV) aka Cervical Cancer Screening completed/performed. Please assist in updating the patient chart by making an External outreach to [facility] located in [place] [phone]. The date of service is 2 years ago .    Thank you,  Tone Lagunas MA  [facility]

## 2025-07-03 ENCOUNTER — HOSPITAL ENCOUNTER (OUTPATIENT)
Dept: RADIOLOGY | Facility: IMAGING CENTER | Age: 50
End: 2025-07-03
Payer: COMMERCIAL

## 2025-07-03 ENCOUNTER — APPOINTMENT (OUTPATIENT)
Dept: LAB | Facility: IMAGING CENTER | Age: 50
End: 2025-07-03
Payer: COMMERCIAL

## 2025-07-03 DIAGNOSIS — Z11.1 SCREENING FOR TUBERCULOSIS: ICD-10-CM

## 2025-07-03 DIAGNOSIS — Z11.4 SCREENING FOR HIV (HUMAN IMMUNODEFICIENCY VIRUS): ICD-10-CM

## 2025-07-03 DIAGNOSIS — Z83.49 FAMILY HISTORY OF HYPOTHYROIDISM: ICD-10-CM

## 2025-07-03 DIAGNOSIS — M79.645 PAIN OF FINGER OF LEFT HAND: ICD-10-CM

## 2025-07-03 DIAGNOSIS — Z13.220 SCREENING FOR HYPERLIPIDEMIA: ICD-10-CM

## 2025-07-03 DIAGNOSIS — Z11.59 NEED FOR HEPATITIS C SCREENING TEST: ICD-10-CM

## 2025-07-03 DIAGNOSIS — R73.01 IFG (IMPAIRED FASTING GLUCOSE): ICD-10-CM

## 2025-07-03 DIAGNOSIS — Z78.9 MEASLES, MUMPS, RUBELLA (MMR) VACCINATION STATUS UNKNOWN: ICD-10-CM

## 2025-07-03 DIAGNOSIS — Z78.9 VARICELLA VACCINATION STATUS UNKNOWN: ICD-10-CM

## 2025-07-03 DIAGNOSIS — Z78.9 HEPATITIS B VACCINATION STATUS UNKNOWN: ICD-10-CM

## 2025-07-03 LAB
ALBUMIN SERPL BCG-MCNC: 4.3 G/DL (ref 3.5–5)
ALP SERPL-CCNC: 47 U/L (ref 34–104)
ALT SERPL W P-5'-P-CCNC: 16 U/L (ref 7–52)
ANION GAP SERPL CALCULATED.3IONS-SCNC: 7 MMOL/L (ref 4–13)
AST SERPL W P-5'-P-CCNC: 21 U/L (ref 13–39)
BASOPHILS # BLD AUTO: 0.04 THOUSANDS/ÂΜL (ref 0–0.1)
BASOPHILS NFR BLD AUTO: 1 % (ref 0–1)
BILIRUB SERPL-MCNC: 0.51 MG/DL (ref 0.2–1)
BUN SERPL-MCNC: 9 MG/DL (ref 5–25)
CALCIUM SERPL-MCNC: 9.1 MG/DL (ref 8.4–10.2)
CHLORIDE SERPL-SCNC: 106 MMOL/L (ref 96–108)
CHOLEST SERPL-MCNC: 180 MG/DL (ref ?–200)
CO2 SERPL-SCNC: 26 MMOL/L (ref 21–32)
CREAT SERPL-MCNC: 0.56 MG/DL (ref 0.6–1.3)
EOSINOPHIL # BLD AUTO: 0.05 THOUSAND/ÂΜL (ref 0–0.61)
EOSINOPHIL NFR BLD AUTO: 1 % (ref 0–6)
ERYTHROCYTE [DISTWIDTH] IN BLOOD BY AUTOMATED COUNT: 13.1 % (ref 11.6–15.1)
EST. AVERAGE GLUCOSE BLD GHB EST-MCNC: 117 MG/DL
GFR SERPL CREATININE-BSD FRML MDRD: 111 ML/MIN/1.73SQ M
GLUCOSE P FAST SERPL-MCNC: 84 MG/DL (ref 65–99)
HBA1C MFR BLD: 5.7 %
HBV SURFACE AB SER-ACNC: <3 MIU/ML
HCT VFR BLD AUTO: 41.1 % (ref 34.8–46.1)
HCV AB SER QL: NORMAL
HDLC SERPL-MCNC: 77 MG/DL
HGB BLD-MCNC: 13.4 G/DL (ref 11.5–15.4)
HIV 1+2 AB+HIV1 P24 AG SERPL QL IA: NORMAL
IMM GRANULOCYTES # BLD AUTO: 0.01 THOUSAND/UL (ref 0–0.2)
IMM GRANULOCYTES NFR BLD AUTO: 0 % (ref 0–2)
LDLC SERPL CALC-MCNC: 83 MG/DL (ref 0–100)
LYMPHOCYTES # BLD AUTO: 1.36 THOUSANDS/ÂΜL (ref 0.6–4.47)
LYMPHOCYTES NFR BLD AUTO: 24 % (ref 14–44)
MCH RBC QN AUTO: 29.7 PG (ref 26.8–34.3)
MCHC RBC AUTO-ENTMCNC: 32.6 G/DL (ref 31.4–37.4)
MCV RBC AUTO: 91 FL (ref 82–98)
MONOCYTES # BLD AUTO: 0.76 THOUSAND/ÂΜL (ref 0.17–1.22)
MONOCYTES NFR BLD AUTO: 13 % (ref 4–12)
NEUTROPHILS # BLD AUTO: 3.52 THOUSANDS/ÂΜL (ref 1.85–7.62)
NEUTS SEG NFR BLD AUTO: 61 % (ref 43–75)
NRBC BLD AUTO-RTO: 0 /100 WBCS
PLATELET # BLD AUTO: 208 THOUSANDS/UL (ref 149–390)
PMV BLD AUTO: 12.8 FL (ref 8.9–12.7)
POTASSIUM SERPL-SCNC: 4.6 MMOL/L (ref 3.5–5.3)
PROT SERPL-MCNC: 7 G/DL (ref 6.4–8.4)
RBC # BLD AUTO: 4.51 MILLION/UL (ref 3.81–5.12)
RUBV IGG SERPL IA-ACNC: 66.8 IU/ML
SODIUM SERPL-SCNC: 139 MMOL/L (ref 135–147)
TRIGL SERPL-MCNC: 102 MG/DL (ref ?–150)
TSH SERPL DL<=0.05 MIU/L-ACNC: 2.87 UIU/ML (ref 0.45–4.5)
URATE SERPL-MCNC: 5.2 MG/DL (ref 2–7.5)
WBC # BLD AUTO: 5.74 THOUSAND/UL (ref 4.31–10.16)

## 2025-07-03 PROCEDURE — 80053 COMPREHEN METABOLIC PANEL: CPT

## 2025-07-03 PROCEDURE — 86787 VARICELLA-ZOSTER ANTIBODY: CPT

## 2025-07-03 PROCEDURE — 36415 COLL VENOUS BLD VENIPUNCTURE: CPT

## 2025-07-03 PROCEDURE — 86735 MUMPS ANTIBODY: CPT

## 2025-07-03 PROCEDURE — 87389 HIV-1 AG W/HIV-1&-2 AB AG IA: CPT

## 2025-07-03 PROCEDURE — 83036 HEMOGLOBIN GLYCOSYLATED A1C: CPT

## 2025-07-03 PROCEDURE — 84550 ASSAY OF BLOOD/URIC ACID: CPT

## 2025-07-03 PROCEDURE — 86765 RUBEOLA ANTIBODY: CPT

## 2025-07-03 PROCEDURE — 85025 COMPLETE CBC W/AUTO DIFF WBC: CPT

## 2025-07-03 PROCEDURE — 84443 ASSAY THYROID STIM HORMONE: CPT

## 2025-07-03 PROCEDURE — 86803 HEPATITIS C AB TEST: CPT

## 2025-07-03 PROCEDURE — 80061 LIPID PANEL: CPT

## 2025-07-03 PROCEDURE — 86762 RUBELLA ANTIBODY: CPT

## 2025-07-03 PROCEDURE — 86480 TB TEST CELL IMMUN MEASURE: CPT

## 2025-07-03 PROCEDURE — 73130 X-RAY EXAM OF HAND: CPT

## 2025-07-03 PROCEDURE — 86706 HEP B SURFACE ANTIBODY: CPT

## 2025-07-03 NOTE — TELEPHONE ENCOUNTER
Upon review of the In Basket request we were able to locate, review, and update the patient chart as requested for Pap Smear (HPV) aka Cervical Cancer Screening.    Any additional questions or concerns should be emailed to the Practice Liaisons via the appropriate education email address, please do not reply via In Basket.    Thank you  Angela Cartagena MA   [facility]

## 2025-07-04 LAB
GAMMA INTERFERON BACKGROUND BLD IA-ACNC: 0.08 IU/ML
M TB IFN-G BLD-IMP: NEGATIVE
M TB IFN-G CD4+ BCKGRND COR BLD-ACNC: -0.01 IU/ML
M TB IFN-G CD4+ BCKGRND COR BLD-ACNC: 0.01 IU/ML
MEV IGG SER QL IA: 45.3 AU/ML
MEV IGG SER QL IA: POSITIVE
MITOGEN IGNF BCKGRD COR BLD-ACNC: 9.92 IU/ML
MUV IGG SER QL IA: 189 AU/ML
MUV IGG SER QL IA: POSITIVE
VZV IGG SER QL IA: 6.79 S/CO
VZV IGG SER QL IA: POSITIVE

## 2025-07-07 DIAGNOSIS — Z00.6 ENCOUNTER FOR EXAMINATION FOR NORMAL COMPARISON OR CONTROL IN CLINICAL RESEARCH PROGRAM: ICD-10-CM

## 2025-07-14 ENCOUNTER — CLINICAL SUPPORT (OUTPATIENT)
Dept: FAMILY MEDICINE CLINIC | Facility: CLINIC | Age: 50
End: 2025-07-14
Payer: COMMERCIAL

## 2025-07-14 DIAGNOSIS — Z23 ENCOUNTER FOR IMMUNIZATION: Primary | ICD-10-CM

## 2025-07-14 PROCEDURE — 90746 HEPB VACCINE 3 DOSE ADULT IM: CPT

## 2025-07-14 PROCEDURE — 90471 IMMUNIZATION ADMIN: CPT

## 2025-07-14 NOTE — PROGRESS NOTES
Patient is here for first dose of  Hep B vaccines. Patient is to return in 1 month from today for 2nd does and in 6 months from today for 3rd dose

## 2025-08-01 PROBLEM — H10.13 ALLERGIC CONJUNCTIVITIS OF BOTH EYES: Status: RESOLVED | Noted: 2025-07-02 | Resolved: 2025-08-01

## 2025-08-14 ENCOUNTER — CLINICAL SUPPORT (OUTPATIENT)
Dept: FAMILY MEDICINE CLINIC | Facility: CLINIC | Age: 50
End: 2025-08-14
Payer: COMMERCIAL